# Patient Record
Sex: FEMALE | Race: WHITE | NOT HISPANIC OR LATINO | Employment: OTHER | URBAN - METROPOLITAN AREA
[De-identification: names, ages, dates, MRNs, and addresses within clinical notes are randomized per-mention and may not be internally consistent; named-entity substitution may affect disease eponyms.]

---

## 2017-05-25 DIAGNOSIS — I10 ESSENTIAL HYPERTENSION: Primary | ICD-10-CM

## 2017-05-25 DIAGNOSIS — E78.5 HYPERLIPIDEMIA, UNSPECIFIED HYPERLIPIDEMIA TYPE: ICD-10-CM

## 2017-06-07 LAB
ALBUMIN SERPL-MCNC: 4.3 G/DL (ref 3.5–5.2)
ALBUMIN/GLOB SERPL: 1.7 G/DL
ALP SERPL-CCNC: 68 U/L (ref 39–117)
ALT SERPL-CCNC: 16 U/L (ref 1–33)
AST SERPL-CCNC: 21 U/L (ref 1–32)
BILIRUB SERPL-MCNC: 0.3 MG/DL (ref 0.1–1.2)
BUN SERPL-MCNC: 17 MG/DL (ref 8–23)
BUN/CREAT SERPL: 19.1 (ref 7–25)
CALCIUM SERPL-MCNC: 10.1 MG/DL (ref 8.6–10.5)
CHLORIDE SERPL-SCNC: 104 MMOL/L (ref 98–107)
CHOLEST SERPL-MCNC: 205 MG/DL (ref 100–199)
CO2 SERPL-SCNC: 25.3 MMOL/L (ref 22–29)
CREAT SERPL-MCNC: 0.89 MG/DL (ref 0.57–1)
GLOBULIN SER CALC-MCNC: 2.5 GM/DL
GLUCOSE SERPL-MCNC: 97 MG/DL (ref 65–99)
HDL SERPL-SCNC: 36.7 UMOL/L
HDLC SERPL-MCNC: 64 MG/DL
LDL SERPL QN: 21.5 NM
LDL SERPL-SCNC: 1193 NMOL/L
LDL SMALL SERPL-SCNC: 301 NMOL/L
LDLC SERPL CALC-MCNC: 123 MG/DL (ref 0–99)
POTASSIUM SERPL-SCNC: 3.8 MMOL/L (ref 3.5–5.2)
PROT SERPL-MCNC: 6.8 G/DL (ref 6–8.5)
SODIUM SERPL-SCNC: 143 MMOL/L (ref 136–145)
TRIGL SERPL-MCNC: 90 MG/DL (ref 0–149)

## 2017-06-13 ENCOUNTER — OFFICE VISIT (OUTPATIENT)
Dept: FAMILY MEDICINE CLINIC | Facility: CLINIC | Age: 62
End: 2017-06-13

## 2017-06-13 VITALS
HEIGHT: 64 IN | BODY MASS INDEX: 21.75 KG/M2 | WEIGHT: 127.4 LBS | OXYGEN SATURATION: 98 % | HEART RATE: 111 BPM | DIASTOLIC BLOOD PRESSURE: 78 MMHG | SYSTOLIC BLOOD PRESSURE: 128 MMHG | TEMPERATURE: 98.7 F

## 2017-06-13 DIAGNOSIS — E78.5 HYPERLIPIDEMIA, UNSPECIFIED HYPERLIPIDEMIA TYPE: ICD-10-CM

## 2017-06-13 DIAGNOSIS — I10 ESSENTIAL HYPERTENSION: Primary | ICD-10-CM

## 2017-06-13 DIAGNOSIS — F41.9 ANXIETY: ICD-10-CM

## 2017-06-13 DIAGNOSIS — Z23 NEED FOR SHINGLES VACCINE: ICD-10-CM

## 2017-06-13 PROCEDURE — 90471 IMMUNIZATION ADMIN: CPT | Performed by: INTERNAL MEDICINE

## 2017-06-13 PROCEDURE — 99213 OFFICE O/P EST LOW 20 MIN: CPT | Performed by: INTERNAL MEDICINE

## 2017-06-13 PROCEDURE — 90736 HZV VACCINE LIVE SUBQ: CPT | Performed by: INTERNAL MEDICINE

## 2017-06-13 RX ORDER — IRBESARTAN 150 MG/1
150 TABLET ORAL DAILY
Qty: 90 TABLET | Refills: 3 | Status: SHIPPED | OUTPATIENT
Start: 2017-06-13 | End: 2018-06-13 | Stop reason: SDUPTHER

## 2017-06-13 RX ORDER — ALPRAZOLAM 0.25 MG/1
0.25 TABLET ORAL DAILY
Qty: 30 TABLET | Refills: 2 | Status: SHIPPED | OUTPATIENT
Start: 2017-06-13 | End: 2017-12-14 | Stop reason: SDUPTHER

## 2017-06-13 NOTE — PROGRESS NOTES
Subjective   Natasha Langford is a 61 y.o. female. Patient is here today for follow-up on her hypertension, hyperlipidemia and anxiety.  She generally is been doing fine and does not take the alprazolam daily.  She's not needed the omeprazole on a regular basis either.  She has had some anxiety but otherwise is been well.  Home blood pressure readings are always generally good.    Chief Complaint   Patient presents with   • Hypertension     follow up labs           Vitals:    06/13/17 1306   BP: 128/78   Pulse: 111   Temp: 98.7 °F (37.1 °C)   SpO2: 98%     The following portions of the patient's history were reviewed and updated as appropriate: allergies, current medications, past family history, past medical history, past social history, past surgical history and problem list.    Past Medical History:   Diagnosis Date   • Plantar fasciitis       Allergies   Allergen Reactions   • Latex    • Procaine       Social History     Social History   • Marital status:      Spouse name: N/A   • Number of children: N/A   • Years of education: N/A     Occupational History   • Not on file.     Social History Main Topics   • Smoking status: Never Smoker   • Smokeless tobacco: Never Used   • Alcohol use Yes   • Drug use: Defer   • Sexual activity: Defer     Other Topics Concern   • Not on file     Social History Narrative        Current Outpatient Prescriptions:   •  ALPRAZolam (XANAX) 0.25 MG tablet, Take 1 tablet by mouth Daily., Disp: 30 tablet, Rfl: 2  •  Cholecalciferol (VITAMIN D) 1000 UNITS tablet, Take  by mouth., Disp: , Rfl:   •  doxycycline (VIBRAMYICN) 100 MG tablet, Take 1 tablet by mouth 2 (Two) Times a Day., Disp: 60 tablet, Rfl: 5  •  irbesartan (AVAPRO) 150 MG tablet, Take 1 tablet by mouth Daily., Disp: 90 tablet, Rfl: 3  •  omeprazole (PriLOSEC) 20 MG capsule, Take 1 capsule by mouth daily., Disp: 30 capsule, Rfl: 6     Objective     History of Present Illness     Review of Systems   Constitutional: Negative.     HENT: Negative.    Eyes: Negative.    Respiratory: Negative.    Cardiovascular: Negative.    Gastrointestinal: Negative.    Genitourinary: Negative.    Musculoskeletal: Negative.    Skin: Negative.    Neurological: Negative.    Psychiatric/Behavioral: Negative.        Physical Exam   Constitutional: She appears well-developed and well-nourished.   Pleasant, cooperative and in no distress with a high blood pressure currently.  Patient has whitecoat hypertension   HENT:   Head: Normocephalic and atraumatic.   Eyes: Conjunctivae are normal.   Neck: Normal range of motion. Neck supple.   Cardiovascular: Normal rate, regular rhythm and normal heart sounds.    Pulmonary/Chest: Effort normal and breath sounds normal. No respiratory distress. She has no wheezes. She has no rales.   Musculoskeletal: Normal range of motion. She exhibits no edema.   Neurological: She is alert.   Skin: Skin is warm and dry.   Psychiatric: She has a normal mood and affect. Her behavior is normal.   Nursing note and vitals reviewed.      ASSESSMENT  overall the patient stable.  Her CMP was completely normal and her lipid panel is stable and acceptable with the LDL particle number in the moderate range.  The patient's GERD is fine and her anxiety is under reasonable control.  Navin was reviewed and is appropriate.     Problem List Items Addressed This Visit        Cardiovascular and Mediastinum    HLD (hyperlipidemia)    BP (high blood pressure) - Primary    Relevant Medications    irbesartan (AVAPRO) 150 MG tablet       Other    Anxiety          PLAN  The patient will get a  Zostavax immunization today.  I refilled her alprazolam.  She will continue her irbesartan and I'll recheck her in 6 months with a CMP and NMR lipid panel    There are no Patient Instructions on file for this visit.  Return in about 6 months (around 12/13/2017) for with labs.

## 2017-06-23 RX ORDER — DOXYCYCLINE HYCLATE 100 MG
TABLET ORAL
Qty: 60 TABLET | Refills: 4 | Status: SHIPPED | OUTPATIENT
Start: 2017-06-23 | End: 2017-12-14 | Stop reason: SDUPTHER

## 2017-12-01 DIAGNOSIS — E78.5 HYPERLIPIDEMIA, UNSPECIFIED HYPERLIPIDEMIA TYPE: ICD-10-CM

## 2017-12-01 DIAGNOSIS — Z11.59 NEED FOR HEPATITIS C SCREENING TEST: Primary | ICD-10-CM

## 2017-12-08 LAB
ALBUMIN SERPL-MCNC: 4.3 G/DL (ref 3.5–5.2)
ALBUMIN/GLOB SERPL: 1.9 G/DL
ALP SERPL-CCNC: 84 U/L (ref 39–117)
ALT SERPL-CCNC: 15 U/L (ref 1–33)
AST SERPL-CCNC: 18 U/L (ref 1–32)
BILIRUB SERPL-MCNC: 0.5 MG/DL (ref 0.1–1.2)
BUN SERPL-MCNC: 20 MG/DL (ref 8–23)
BUN/CREAT SERPL: 22 (ref 7–25)
CALCIUM SERPL-MCNC: 9.9 MG/DL (ref 8.6–10.5)
CHLORIDE SERPL-SCNC: 103 MMOL/L (ref 98–107)
CHOLEST SERPL-MCNC: 199 MG/DL (ref 0–200)
CO2 SERPL-SCNC: 26.7 MMOL/L (ref 22–29)
CREAT SERPL-MCNC: 0.91 MG/DL (ref 0.57–1)
GFR SERPLBLD CREATININE-BSD FMLA CKD-EPI: 62 ML/MIN/1.73
GFR SERPLBLD CREATININE-BSD FMLA CKD-EPI: 76 ML/MIN/1.73
GLOBULIN SER CALC-MCNC: 2.3 GM/DL
GLUCOSE SERPL-MCNC: 92 MG/DL (ref 65–99)
HCV AB S/CO SERPL IA: <0.1 S/CO RATIO (ref 0–0.9)
HCV AB SERPL QL IA: NORMAL
HDLC SERPL-MCNC: 71 MG/DL (ref 40–60)
LDLC SERPL CALC-MCNC: 110 MG/DL (ref 0–100)
LDLC/HDLC SERPL: 1.55 {RATIO}
POTASSIUM SERPL-SCNC: 4.1 MMOL/L (ref 3.5–5.2)
PROT SERPL-MCNC: 6.6 G/DL (ref 6–8.5)
SODIUM SERPL-SCNC: 142 MMOL/L (ref 136–145)
TRIGL SERPL-MCNC: 89 MG/DL (ref 0–150)
VLDLC SERPL CALC-MCNC: 17.8 MG/DL (ref 5–40)

## 2017-12-14 ENCOUNTER — OFFICE VISIT (OUTPATIENT)
Dept: FAMILY MEDICINE CLINIC | Facility: CLINIC | Age: 62
End: 2017-12-14

## 2017-12-14 VITALS
DIASTOLIC BLOOD PRESSURE: 80 MMHG | HEART RATE: 112 BPM | OXYGEN SATURATION: 99 % | HEIGHT: 64 IN | SYSTOLIC BLOOD PRESSURE: 132 MMHG | BODY MASS INDEX: 21.41 KG/M2 | WEIGHT: 125.4 LBS | TEMPERATURE: 98 F

## 2017-12-14 DIAGNOSIS — E78.5 HYPERLIPIDEMIA, UNSPECIFIED HYPERLIPIDEMIA TYPE: ICD-10-CM

## 2017-12-14 DIAGNOSIS — I10 ESSENTIAL HYPERTENSION: Primary | ICD-10-CM

## 2017-12-14 DIAGNOSIS — F41.9 ANXIETY: ICD-10-CM

## 2017-12-14 PROCEDURE — 99214 OFFICE O/P EST MOD 30 MIN: CPT | Performed by: INTERNAL MEDICINE

## 2017-12-14 RX ORDER — DOXYCYCLINE HYCLATE 100 MG
100 TABLET ORAL 2 TIMES DAILY
Qty: 60 TABLET | Refills: 4 | Status: SHIPPED | OUTPATIENT
Start: 2017-12-14 | End: 2018-06-13 | Stop reason: SDUPTHER

## 2017-12-14 RX ORDER — SIMVASTATIN 5 MG
5 TABLET ORAL NIGHTLY
Qty: 30 TABLET | Refills: 5 | Status: SHIPPED | OUTPATIENT
Start: 2017-12-14 | End: 2018-08-21 | Stop reason: SDUPTHER

## 2017-12-14 RX ORDER — ALPRAZOLAM 0.25 MG/1
0.25 TABLET ORAL DAILY
Qty: 30 TABLET | Refills: 2 | Status: SHIPPED | OUTPATIENT
Start: 2017-12-14 | End: 2018-06-13 | Stop reason: SDUPTHER

## 2017-12-14 NOTE — PROGRESS NOTES
Subjective   Natasha Langford is a 61 y.o. female. Patient is here today for follow-up on her hypertension, hyperlipidemia and anxiety.  She is generally been doing well.  She uses the alprazolam about once every other day and is fine.  Home blood pressure readings and then okay and she's had no medicine side effects.  She has seen the gynecologist in the summer and had mammograms and Pap smear.  She has not had a colonoscopy.  She got a flu shot earlier.    Chief Complaint   Patient presents with   • Hyperlipidemia     HTN, ANXIETY- FOLLOW UP LABS AND NEEDS REFILL ON ALPRAZOLAM           Vitals:    12/14/17 0756   BP: 132/80   Pulse: 112   Temp: 98 °F (36.7 °C)   SpO2: 99%     The following portions of the patient's history were reviewed and updated as appropriate: allergies, current medications, past family history, past medical history, past social history, past surgical history and problem list.    Past Medical History:   Diagnosis Date   • Plantar fasciitis       Allergies   Allergen Reactions   • Latex    • Procaine       Social History     Social History   • Marital status:      Spouse name: N/A   • Number of children: N/A   • Years of education: N/A     Occupational History   • Not on file.     Social History Main Topics   • Smoking status: Never Smoker   • Smokeless tobacco: Never Used   • Alcohol use Yes   • Drug use: Defer   • Sexual activity: Defer     Other Topics Concern   • Not on file     Social History Narrative        Current Outpatient Prescriptions:   •  ALPRAZolam (XANAX) 0.25 MG tablet, Take 1 tablet by mouth Daily., Disp: 30 tablet, Rfl: 2  •  Cholecalciferol (VITAMIN D) 1000 UNITS tablet, Take  by mouth., Disp: , Rfl:   •  doxycycline (VIBRAMYICN) 100 MG tablet, Take 1 tablet by mouth 2 (Two) Times a Day., Disp: 60 tablet, Rfl: 4  •  irbesartan (AVAPRO) 150 MG tablet, Take 1 tablet by mouth Daily., Disp: 90 tablet, Rfl: 3  •  omeprazole (PriLOSEC) 20 MG capsule, Take 1 capsule by mouth  daily., Disp: 30 capsule, Rfl: 6  •  simvastatin (ZOCOR) 5 MG tablet, Take 1 tablet by mouth Every Night., Disp: 30 tablet, Rfl: 5     Objective     History of Present Illness     Review of Systems   Constitutional: Negative.    HENT: Negative.    Eyes: Negative.    Respiratory: Negative.    Cardiovascular: Negative.    Gastrointestinal: Negative.    Endocrine: Negative.    Genitourinary: Negative.    Musculoskeletal: Negative.    Neurological: Negative.    Psychiatric/Behavioral: Negative.        Physical Exam   Constitutional: She is oriented to person, place, and time. She appears well-developed and well-nourished.   Pleasant, cooperative no distress with a blood pressure 130/80   HENT:   Head: Normocephalic and atraumatic.   Eyes: Conjunctivae are normal. Pupils are equal, round, and reactive to light. No scleral icterus.   Neck: Normal range of motion. Neck supple.   Cardiovascular: Normal rate, regular rhythm and normal heart sounds.    Pulmonary/Chest: Effort normal and breath sounds normal. No respiratory distress. She has no wheezes. She has no rales.   Musculoskeletal: Normal range of motion. She exhibits no edema.   Neurological: She is alert and oriented to person, place, and time.   Skin: Skin is warm and dry.   Psychiatric: She has a normal mood and affect. Her behavior is normal.   Nursing note and vitals reviewed.      ASSESSMENT  hepatitis C screen was negative.  CMP was completely normal and lipid panel is stable with a total cholesterol 199, HDL 71,  and triglycerides of 89.  #1-hypertension, well controlled  #2-hyperlipidemia, stable  #3-anxiety, adequately controlled on occasional alprazolam  The patient's Navin was reviewed and is appropriate.     Problem List Items Addressed This Visit        Cardiovascular and Mediastinum    HLD (hyperlipidemia)    Relevant Medications    simvastatin (ZOCOR) 5 MG tablet    BP (high blood pressure) - Primary       Other    Anxiety          PLAN  I  refilled the patient's alprazolam.  I'm going to start her on simvastatin 5 mg daily for her lipids and would like to recheck her in 6 months for complete physical exam including an EKG, CBC, CMP, NMR lipid panel, TSH, vitamin D level, urinalysis but no chest x-ray less she's having pulmonary symptoms    There are no Patient Instructions on file for this visit.  Return in about 6 months (around 6/14/2018) for with labs, Annual physical.

## 2018-05-31 DIAGNOSIS — E78.5 HYPERLIPIDEMIA, UNSPECIFIED HYPERLIPIDEMIA TYPE: ICD-10-CM

## 2018-05-31 DIAGNOSIS — I10 ESSENTIAL HYPERTENSION: ICD-10-CM

## 2018-05-31 DIAGNOSIS — E55.9 AVITAMINOSIS D: ICD-10-CM

## 2018-06-08 LAB
25(OH)D3+25(OH)D2 SERPL-MCNC: 31.3 NG/ML (ref 30–100)
ALBUMIN SERPL-MCNC: 4.1 G/DL (ref 3.5–5.2)
ALBUMIN/GLOB SERPL: 1.9 G/DL
ALP SERPL-CCNC: 71 U/L (ref 39–117)
ALT SERPL-CCNC: 14 U/L (ref 1–33)
AST SERPL-CCNC: 19 U/L (ref 1–32)
BASOPHILS # BLD AUTO: 0.06 10*3/MM3 (ref 0–0.2)
BASOPHILS NFR BLD AUTO: 1.1 % (ref 0–1.5)
BILIRUB SERPL-MCNC: 0.5 MG/DL (ref 0.1–1.2)
BUN SERPL-MCNC: 17 MG/DL (ref 8–23)
BUN/CREAT SERPL: 16.3 (ref 7–25)
CALCIUM SERPL-MCNC: 10 MG/DL (ref 8.6–10.5)
CHLORIDE SERPL-SCNC: 102 MMOL/L (ref 98–107)
CHOLEST SERPL-MCNC: 210 MG/DL (ref 0–200)
CO2 SERPL-SCNC: 27.4 MMOL/L (ref 22–29)
CREAT SERPL-MCNC: 1.04 MG/DL (ref 0.57–1)
EOSINOPHIL # BLD AUTO: 0.1 10*3/MM3 (ref 0–0.7)
EOSINOPHIL NFR BLD AUTO: 1.8 % (ref 0.3–6.2)
ERYTHROCYTE [DISTWIDTH] IN BLOOD BY AUTOMATED COUNT: 13.3 % (ref 11.7–13)
GFR SERPLBLD CREATININE-BSD FMLA CKD-EPI: 53 ML/MIN/1.73
GFR SERPLBLD CREATININE-BSD FMLA CKD-EPI: 65 ML/MIN/1.73
GLOBULIN SER CALC-MCNC: 2.2 GM/DL
GLUCOSE SERPL-MCNC: 92 MG/DL (ref 65–99)
HCT VFR BLD AUTO: 42.6 % (ref 35.6–45.5)
HDLC SERPL-MCNC: 66 MG/DL (ref 40–60)
HGB BLD-MCNC: 13.4 G/DL (ref 11.9–15.5)
IMM GRANULOCYTES # BLD: 0 10*3/MM3 (ref 0–0.03)
IMM GRANULOCYTES NFR BLD: 0 % (ref 0–0.5)
LDLC SERPL CALC-MCNC: 123 MG/DL (ref 0–100)
LDLC/HDLC SERPL: 1.86 {RATIO}
LYMPHOCYTES # BLD AUTO: 2.04 10*3/MM3 (ref 0.9–4.8)
LYMPHOCYTES NFR BLD AUTO: 36.1 % (ref 19.6–45.3)
MCH RBC QN AUTO: 29.2 PG (ref 26.9–32)
MCHC RBC AUTO-ENTMCNC: 31.5 G/DL (ref 32.4–36.3)
MCV RBC AUTO: 92.8 FL (ref 80.5–98.2)
MONOCYTES # BLD AUTO: 0.55 10*3/MM3 (ref 0.2–1.2)
MONOCYTES NFR BLD AUTO: 9.7 % (ref 5–12)
NEUTROPHILS # BLD AUTO: 2.9 10*3/MM3 (ref 1.9–8.1)
NEUTROPHILS NFR BLD AUTO: 51.3 % (ref 42.7–76)
PLATELET # BLD AUTO: 278 10*3/MM3 (ref 140–500)
POTASSIUM SERPL-SCNC: 4.3 MMOL/L (ref 3.5–5.2)
PROT SERPL-MCNC: 6.3 G/DL (ref 6–8.5)
RBC # BLD AUTO: 4.59 10*6/MM3 (ref 3.9–5.2)
SODIUM SERPL-SCNC: 142 MMOL/L (ref 136–145)
TRIGL SERPL-MCNC: 106 MG/DL (ref 0–150)
TSH SERPL DL<=0.005 MIU/L-ACNC: 2.69 MIU/ML (ref 0.27–4.2)
VLDLC SERPL CALC-MCNC: 21.2 MG/DL (ref 5–40)
WBC # BLD AUTO: 5.65 10*3/MM3 (ref 4.5–10.7)

## 2018-06-13 ENCOUNTER — OFFICE VISIT (OUTPATIENT)
Dept: FAMILY MEDICINE CLINIC | Facility: CLINIC | Age: 63
End: 2018-06-13

## 2018-06-13 VITALS
BODY MASS INDEX: 21.78 KG/M2 | HEART RATE: 91 BPM | WEIGHT: 127.6 LBS | DIASTOLIC BLOOD PRESSURE: 80 MMHG | SYSTOLIC BLOOD PRESSURE: 120 MMHG | HEIGHT: 64 IN | OXYGEN SATURATION: 98 % | TEMPERATURE: 98.8 F

## 2018-06-13 DIAGNOSIS — I10 ESSENTIAL HYPERTENSION: Primary | ICD-10-CM

## 2018-06-13 DIAGNOSIS — Z12.11 ENCOUNTER FOR SCREENING COLONOSCOPY: ICD-10-CM

## 2018-06-13 DIAGNOSIS — E78.5 HYPERLIPIDEMIA, UNSPECIFIED HYPERLIPIDEMIA TYPE: ICD-10-CM

## 2018-06-13 DIAGNOSIS — E55.9 AVITAMINOSIS D: ICD-10-CM

## 2018-06-13 DIAGNOSIS — K21.9 GASTROESOPHAGEAL REFLUX DISEASE, ESOPHAGITIS PRESENCE NOT SPECIFIED: ICD-10-CM

## 2018-06-13 DIAGNOSIS — F41.9 ANXIETY: ICD-10-CM

## 2018-06-13 PROCEDURE — 99214 OFFICE O/P EST MOD 30 MIN: CPT | Performed by: INTERNAL MEDICINE

## 2018-06-13 RX ORDER — ALPRAZOLAM 0.25 MG/1
0.25 TABLET ORAL DAILY
Qty: 30 TABLET | Refills: 2 | Status: SHIPPED | OUTPATIENT
Start: 2018-06-13 | End: 2019-01-15 | Stop reason: SDUPTHER

## 2018-06-13 RX ORDER — IRBESARTAN 150 MG/1
150 TABLET ORAL DAILY
Qty: 90 TABLET | Refills: 3 | Status: SHIPPED | OUTPATIENT
Start: 2018-06-13 | End: 2019-06-04 | Stop reason: SDUPTHER

## 2018-06-13 RX ORDER — DOXYCYCLINE HYCLATE 100 MG
100 TABLET ORAL 2 TIMES DAILY
Qty: 60 TABLET | Refills: 4 | Status: SHIPPED | OUTPATIENT
Start: 2018-06-13 | End: 2019-01-15 | Stop reason: SDUPTHER

## 2018-06-13 NOTE — PROGRESS NOTES
Subjective   Natasha Langford is a 62 y.o. female. Patient is here today for follow-up on her hyperlipidemia, hypertension, vitamin D deficiency, GERD, and anxiety.  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  She is had no chest pain, shortness of breath, edema or myalgias and is tolerating medications.  She just has been taking the simvastatin regularly for about the past month.  Chief Complaint   Patient presents with   • Hyperlipidemia     HTN, AVITAMINOSIS D, ACID REFLUX- FOLLOW UP LABS AND MEDS   • Anxiety     NEEDS REFILL ON ALPRAZOLAM           Vitals:    06/13/18 0808   BP: 120/80   Pulse: 91   Temp: 98.8 °F (37.1 °C)   SpO2: 98%     The following portions of the patient's history were reviewed and updated as appropriate: allergies, current medications, past family history, past medical history, past social history, past surgical history and problem list.    Past Medical History:   Diagnosis Date   • Plantar fasciitis       Allergies   Allergen Reactions   • Latex    • Procaine       Social History     Social History   • Marital status:      Spouse name: N/A   • Number of children: N/A   • Years of education: N/A     Occupational History   • Not on file.     Social History Main Topics   • Smoking status: Never Smoker   • Smokeless tobacco: Never Used   • Alcohol use Yes   • Drug use: Unknown   • Sexual activity: Defer     Other Topics Concern   • Not on file     Social History Narrative   • No narrative on file        Current Outpatient Prescriptions:   •  ALPRAZolam (XANAX) 0.25 MG tablet, Take 1 tablet by mouth Daily., Disp: 30 tablet, Rfl: 2  •  Cholecalciferol (VITAMIN D) 1000 UNITS tablet, Take  by mouth., Disp: , Rfl:   •  doxycycline (VIBRAMYICN) 100 MG tablet, Take 1 tablet by mouth 2 (Two) Times a Day., Disp: 60 tablet, Rfl: 4  •  irbesartan (AVAPRO) 150 MG tablet, Take 1 tablet by mouth Daily., Disp: 90 tablet, Rfl: 3  •  simvastatin (ZOCOR) 5 MG tablet, Take 1 tablet by mouth  Every Night., Disp: 30 tablet, Rfl: 5  •  omeprazole (PriLOSEC) 20 MG capsule, Take 1 capsule by mouth daily., Disp: 30 capsule, Rfl: 6     Objective     History of Present Illness     Review of Systems   Constitutional: Negative.    HENT: Negative.    Eyes: Negative.    Respiratory: Negative.    Cardiovascular: Negative.    Gastrointestinal: Negative.    Endocrine: Negative.    Genitourinary: Negative.    Musculoskeletal: Negative.    Skin: Negative.    Neurological: Negative.    Psychiatric/Behavioral: Negative.        Physical Exam   Constitutional: She is oriented to person, place, and time. She appears well-developed and well-nourished.   Pleasant, cooperative no distress blood pressure 135/80   HENT:   Head: Normocephalic and atraumatic.   Eyes: Conjunctivae are normal. Pupils are equal, round, and reactive to light. No scleral icterus.   Neck: Normal range of motion. Neck supple. No thyromegaly present.   Cardiovascular: Normal rate, regular rhythm and normal heart sounds.    Pulmonary/Chest: Effort normal and breath sounds normal. No respiratory distress. She has no wheezes. She has no rales.   Musculoskeletal: Normal range of motion. She exhibits no edema.   Neurological: She is alert and oriented to person, place, and time.   Skin: Skin is warm and dry.   Psychiatric: She has a normal mood and affect. Her behavior is normal.   Nursing note and vitals reviewed.      ASSESSMENT  CBC was normal.  CMP was fine aside from a creatinine of 1.04.  TSH was quite normal and vitamin D level was normal.  Her lipid panel is still high with a total cholesterol 210, good HDL of 66 but an LDL high at 123  #1-hypertension, adequately controlled  #2-hyperlipidemia, not optimally controlled but only back on the medicine for a month.  #3-vitamin D deficiency, normal levels today  #4-GERD, controlled on medication  #5-anxiety, mild and less of a problem during the summer and controlled by medication  Her Navin was reviewed  and is appropriate.     Problem List Items Addressed This Visit        Cardiovascular and Mediastinum    HLD (hyperlipidemia)    BP (high blood pressure) - Primary    Relevant Medications    irbesartan (AVAPRO) 150 MG tablet       Digestive    Avitaminosis D       Other    Anxiety      Other Visit Diagnoses     Encounter for screening colonoscopy        Relevant Orders    Ambulatory Referral For Screening Colonoscopy          PLAN  I referred the patient to the general surgeons for routine colonoscopy.  I recommended shingles and hepatitis A immunizations.  I refilled the patient's medications.  I plan on rechecking her in 2 months on her cholesterol with that CMP and lipid panel    There are no Patient Instructions on file for this visit.  Return in about 2 months (around 8/13/2018) for with labs.

## 2018-08-06 DIAGNOSIS — E78.5 HYPERLIPIDEMIA, UNSPECIFIED HYPERLIPIDEMIA TYPE: Primary | ICD-10-CM

## 2018-08-13 LAB
ALBUMIN SERPL-MCNC: 4.2 G/DL (ref 3.5–5.2)
ALBUMIN/GLOB SERPL: 1.8 G/DL
ALP SERPL-CCNC: 82 U/L (ref 39–117)
ALT SERPL-CCNC: 16 U/L (ref 1–33)
AST SERPL-CCNC: 22 U/L (ref 1–32)
BILIRUB SERPL-MCNC: 0.7 MG/DL (ref 0.1–1.2)
BUN SERPL-MCNC: 18 MG/DL (ref 8–23)
BUN/CREAT SERPL: 16.2 (ref 7–25)
CALCIUM SERPL-MCNC: 9.9 MG/DL (ref 8.6–10.5)
CHLORIDE SERPL-SCNC: 103 MMOL/L (ref 98–107)
CHOLEST SERPL-MCNC: 171 MG/DL (ref 0–200)
CO2 SERPL-SCNC: 24.3 MMOL/L (ref 22–29)
CREAT SERPL-MCNC: 1.11 MG/DL (ref 0.57–1)
GLOBULIN SER CALC-MCNC: 2.4 GM/DL
GLUCOSE SERPL-MCNC: 88 MG/DL (ref 65–99)
HDLC SERPL-MCNC: 70 MG/DL (ref 40–60)
LDLC SERPL CALC-MCNC: 87 MG/DL (ref 0–100)
LDLC/HDLC SERPL: 1.24 {RATIO}
POTASSIUM SERPL-SCNC: 4.1 MMOL/L (ref 3.5–5.2)
PROT SERPL-MCNC: 6.6 G/DL (ref 6–8.5)
SODIUM SERPL-SCNC: 142 MMOL/L (ref 136–145)
TRIGL SERPL-MCNC: 72 MG/DL (ref 0–150)
VLDLC SERPL CALC-MCNC: 14.4 MG/DL (ref 5–40)

## 2018-08-21 ENCOUNTER — OFFICE VISIT (OUTPATIENT)
Dept: FAMILY MEDICINE CLINIC | Facility: CLINIC | Age: 63
End: 2018-08-21

## 2018-08-21 VITALS
OXYGEN SATURATION: 98 % | HEART RATE: 86 BPM | HEIGHT: 64 IN | BODY MASS INDEX: 20.76 KG/M2 | SYSTOLIC BLOOD PRESSURE: 110 MMHG | DIASTOLIC BLOOD PRESSURE: 66 MMHG | TEMPERATURE: 98.1 F | WEIGHT: 121.6 LBS

## 2018-08-21 DIAGNOSIS — I10 ESSENTIAL HYPERTENSION: Primary | ICD-10-CM

## 2018-08-21 DIAGNOSIS — E78.5 HYPERLIPIDEMIA, UNSPECIFIED HYPERLIPIDEMIA TYPE: ICD-10-CM

## 2018-08-21 DIAGNOSIS — K21.9 GASTROESOPHAGEAL REFLUX DISEASE, ESOPHAGITIS PRESENCE NOT SPECIFIED: ICD-10-CM

## 2018-08-21 DIAGNOSIS — F41.9 ANXIETY: ICD-10-CM

## 2018-08-21 PROCEDURE — 99214 OFFICE O/P EST MOD 30 MIN: CPT | Performed by: INTERNAL MEDICINE

## 2018-08-21 RX ORDER — SIMVASTATIN 5 MG
5 TABLET ORAL NIGHTLY
Qty: 90 TABLET | Refills: 3 | Status: SHIPPED | OUTPATIENT
Start: 2018-08-21 | End: 2019-08-02 | Stop reason: SDUPTHER

## 2018-08-21 NOTE — PROGRESS NOTES
Subjective   Natasha Langford is a 62 y.o. female. Patient is here today for follow-up on her hyperlipidemia, hypertension and GERD.  She is doing fine and is had no chest pain, shortness of breath, edema or myalgias.  She also has some chronic anxiety that's been controlled on alprazolam and she does not take that regularly.  She is noted no side effects from the simvastatin.  Chief Complaint   Patient presents with   • Hyperlipidemia     HIGH BP- FOLLOW UP LABS          Vitals:    08/21/18 0823   BP: 110/66   Pulse: 86   Temp: 98.1 °F (36.7 °C)   SpO2: 98%     The following portions of the patient's history were reviewed and updated as appropriate: allergies, current medications, past family history, past medical history, past social history, past surgical history and problem list.    Past Medical History:   Diagnosis Date   • Plantar fasciitis       Allergies   Allergen Reactions   • Latex    • Procaine       Social History     Social History   • Marital status:      Spouse name: N/A   • Number of children: N/A   • Years of education: N/A     Occupational History   • Not on file.     Social History Main Topics   • Smoking status: Never Smoker   • Smokeless tobacco: Never Used   • Alcohol use Yes   • Drug use: Unknown   • Sexual activity: Defer     Other Topics Concern   • Not on file     Social History Narrative   • No narrative on file        Current Outpatient Prescriptions:   •  ALPRAZolam (XANAX) 0.25 MG tablet, Take 1 tablet by mouth Daily., Disp: 30 tablet, Rfl: 2  •  Cholecalciferol (VITAMIN D) 1000 UNITS tablet, Take  by mouth., Disp: , Rfl:   •  doxycycline (VIBRAMYICN) 100 MG tablet, Take 1 tablet by mouth 2 (Two) Times a Day., Disp: 60 tablet, Rfl: 4  •  irbesartan (AVAPRO) 150 MG tablet, Take 1 tablet by mouth Daily., Disp: 90 tablet, Rfl: 3  •  omeprazole (PriLOSEC) 20 MG capsule, Take 1 capsule by mouth daily., Disp: 30 capsule, Rfl: 6  •  simvastatin (ZOCOR) 5 MG tablet, Take 1 tablet by mouth  Every Night., Disp: 90 tablet, Rfl: 3     Objective     History of Present Illness     Review of Systems   Constitutional: Negative.    HENT: Negative.    Eyes: Negative.    Respiratory: Negative.    Cardiovascular: Negative.    Gastrointestinal: Negative.    Endocrine: Negative.    Genitourinary: Negative.    Musculoskeletal: Negative.    Skin: Negative.    Neurological: Negative.    Psychiatric/Behavioral: Negative.        Physical Exam   Constitutional: She is oriented to person, place, and time. She appears well-developed and well-nourished.   Pleasant, cooperative no distress blood pressure 130/80   HENT:   Head: Normocephalic and atraumatic.   Eyes: Pupils are equal, round, and reactive to light. Conjunctivae are normal. No scleral icterus.   Neck: Normal range of motion. Neck supple.   Cardiovascular: Normal rate, regular rhythm and normal heart sounds.    Pulmonary/Chest: Effort normal and breath sounds normal. No respiratory distress. She has no wheezes. She has no rales.   Musculoskeletal: Normal range of motion. She exhibits no edema.   Neurological: She is alert and oriented to person, place, and time.   Skin: Skin is warm and dry.   Psychiatric: She has a normal mood and affect. Her behavior is normal.   Nursing note and vitals reviewed.      ASSESSMENT  CMP is generally normal aside from a creatinine of 1.11.  Lipid panel is improved with a total cholesterol 171, good HDL of 70 and LDL of 87  #1-hypertension controlled  #2-hyperlipidemia, well controlled on simvastatin  #3-history of GERD, controlled on medication  #4-anxiety, controlled with occasional alprazolam     Problem List Items Addressed This Visit        Cardiovascular and Mediastinum    HLD (hyperlipidemia)    Relevant Medications    simvastatin (ZOCOR) 5 MG tablet    BP (high blood pressure) - Primary          PLAN  I recommended the hepatitis A and shingles vaccinations.  Patient will continue current medicines as now.  I like to recheck  her in about 4 months with a CMP, lipid panel    There are no Patient Instructions on file for this visit.  Return in about 4 months (around 12/21/2018) for with labs.

## 2019-01-07 DIAGNOSIS — Z79.899 LONG TERM USE OF DRUG: ICD-10-CM

## 2019-01-07 DIAGNOSIS — E78.5 HYPERLIPIDEMIA, UNSPECIFIED HYPERLIPIDEMIA TYPE: Primary | ICD-10-CM

## 2019-01-08 LAB
ALBUMIN SERPL-MCNC: 4.4 G/DL (ref 3.5–5.2)
ALBUMIN/GLOB SERPL: 1.6 G/DL
ALP SERPL-CCNC: 74 U/L (ref 39–117)
ALT SERPL-CCNC: 16 U/L (ref 1–33)
AST SERPL-CCNC: 17 U/L (ref 1–32)
BILIRUB SERPL-MCNC: 0.7 MG/DL (ref 0.1–1.2)
BUN SERPL-MCNC: 19 MG/DL (ref 8–23)
BUN/CREAT SERPL: 20 (ref 7–25)
CALCIUM SERPL-MCNC: 10.1 MG/DL (ref 8.6–10.5)
CHLORIDE SERPL-SCNC: 104 MMOL/L (ref 98–107)
CHOLEST SERPL-MCNC: 191 MG/DL (ref 0–200)
CO2 SERPL-SCNC: 27.5 MMOL/L (ref 22–29)
CREAT SERPL-MCNC: 0.95 MG/DL (ref 0.57–1)
GLOBULIN SER CALC-MCNC: 2.8 GM/DL
GLUCOSE SERPL-MCNC: 96 MG/DL (ref 65–99)
HDLC SERPL-MCNC: 68 MG/DL (ref 40–60)
LDLC SERPL CALC-MCNC: 104 MG/DL (ref 0–100)
LDLC/HDLC SERPL: 1.52 {RATIO}
POTASSIUM SERPL-SCNC: 4.5 MMOL/L (ref 3.5–5.2)
PROT SERPL-MCNC: 7.2 G/DL (ref 6–8.5)
SODIUM SERPL-SCNC: 142 MMOL/L (ref 136–145)
TRIGL SERPL-MCNC: 97 MG/DL (ref 0–150)
VLDLC SERPL CALC-MCNC: 19.4 MG/DL (ref 5–40)

## 2019-01-15 ENCOUNTER — OFFICE VISIT (OUTPATIENT)
Dept: FAMILY MEDICINE CLINIC | Facility: CLINIC | Age: 64
End: 2019-01-15

## 2019-01-15 VITALS
RESPIRATION RATE: 15 BRPM | SYSTOLIC BLOOD PRESSURE: 110 MMHG | OXYGEN SATURATION: 98 % | TEMPERATURE: 98 F | HEART RATE: 120 BPM | WEIGHT: 126 LBS | HEIGHT: 64 IN | DIASTOLIC BLOOD PRESSURE: 70 MMHG | BODY MASS INDEX: 21.51 KG/M2

## 2019-01-15 DIAGNOSIS — K21.9 GASTROESOPHAGEAL REFLUX DISEASE, ESOPHAGITIS PRESENCE NOT SPECIFIED: ICD-10-CM

## 2019-01-15 DIAGNOSIS — Z23 NEED FOR VACCINATION: ICD-10-CM

## 2019-01-15 DIAGNOSIS — L70.0 ACNE VULGARIS: ICD-10-CM

## 2019-01-15 DIAGNOSIS — I10 ESSENTIAL HYPERTENSION: Primary | ICD-10-CM

## 2019-01-15 DIAGNOSIS — E78.5 HYPERLIPIDEMIA, UNSPECIFIED HYPERLIPIDEMIA TYPE: ICD-10-CM

## 2019-01-15 DIAGNOSIS — E55.9 AVITAMINOSIS D: ICD-10-CM

## 2019-01-15 DIAGNOSIS — F41.9 ANXIETY: ICD-10-CM

## 2019-01-15 PROCEDURE — 90471 IMMUNIZATION ADMIN: CPT | Performed by: INTERNAL MEDICINE

## 2019-01-15 PROCEDURE — 90670 PCV13 VACCINE IM: CPT | Performed by: INTERNAL MEDICINE

## 2019-01-15 PROCEDURE — 99214 OFFICE O/P EST MOD 30 MIN: CPT | Performed by: INTERNAL MEDICINE

## 2019-01-15 RX ORDER — DOXYCYCLINE HYCLATE 100 MG
100 TABLET ORAL 2 TIMES DAILY
Qty: 60 TABLET | Refills: 4 | Status: SHIPPED | OUTPATIENT
Start: 2019-01-15 | End: 2019-08-28 | Stop reason: SDUPTHER

## 2019-01-15 RX ORDER — ALPRAZOLAM 0.25 MG/1
0.25 TABLET ORAL DAILY
Qty: 30 TABLET | Refills: 2 | Status: SHIPPED | OUTPATIENT
Start: 2019-01-15 | End: 2019-08-02 | Stop reason: SDUPTHER

## 2019-01-15 NOTE — PROGRESS NOTES
Subjective   Natasha Langford is a 63 y.o. female. Patient is here today for follow-up on her hypertension, hyperlipidemia, GERD, vitamin D deficiency, anxiety and history of facial acne.  She generally been stable on medications.  She continues on the doxycycline twice a day as needed for outbreaks.  She is using over-the-counter omeprazole for her GERD but does not needed every day.  Otherwise she's felt well and is had no chest pain, shortness of breath, edema or myalgias.  Chief Complaint   Patient presents with   • Hypertension   • Hyperlipidemia          Vitals:    01/15/19 0838   BP: 110/70   Pulse: 120   Resp: 15   Temp: 98 °F (36.7 °C)   SpO2: 98%     The following portions of the patient's history were reviewed and updated as appropriate: allergies, current medications, past family history, past medical history, past social history, past surgical history and problem list.    Past Medical History:   Diagnosis Date   • Plantar fasciitis       Allergies   Allergen Reactions   • Latex    • Procaine       Social History     Socioeconomic History   • Marital status:      Spouse name: Not on file   • Number of children: Not on file   • Years of education: Not on file   • Highest education level: Not on file   Social Needs   • Financial resource strain: Not on file   • Food insecurity - worry: Not on file   • Food insecurity - inability: Not on file   • Transportation needs - medical: Not on file   • Transportation needs - non-medical: Not on file   Occupational History   • Not on file   Tobacco Use   • Smoking status: Never Smoker   • Smokeless tobacco: Never Used   Substance and Sexual Activity   • Alcohol use: Yes   • Drug use: Defer   • Sexual activity: Defer   Other Topics Concern   • Not on file   Social History Narrative   • Not on file        Current Outpatient Medications:   •  ALPRAZolam (XANAX) 0.25 MG tablet, Take 1 tablet by mouth Daily., Disp: 30 tablet, Rfl: 2  •  Cholecalciferol (VITAMIN D) 1000  UNITS tablet, Take  by mouth., Disp: , Rfl:   •  doxycycline (VIBRAMYICN) 100 MG tablet, Take 1 tablet by mouth 2 (Two) Times a Day., Disp: 60 tablet, Rfl: 4  •  irbesartan (AVAPRO) 150 MG tablet, Take 1 tablet by mouth Daily., Disp: 90 tablet, Rfl: 3  •  omeprazole (PriLOSEC) 20 MG capsule, Take 1 capsule by mouth daily., Disp: 30 capsule, Rfl: 6  •  simvastatin (ZOCOR) 5 MG tablet, Take 1 tablet by mouth Every Night., Disp: 90 tablet, Rfl: 3     Objective     History of Present Illness     Review of Systems   Constitutional: Negative.    HENT: Negative.    Eyes: Negative.    Respiratory: Negative.    Cardiovascular: Negative.    Gastrointestinal: Negative.    Genitourinary: Negative.    Musculoskeletal: Negative.    Neurological: Negative.    Psychiatric/Behavioral: Negative.        Physical Exam   Constitutional: She is oriented to person, place, and time. She appears well-developed and well-nourished.   Pleasant, cooperative no distress blood pressure 120/80   HENT:   Head: Normocephalic and atraumatic.   Eyes: Conjunctivae are normal. Pupils are equal, round, and reactive to light. No scleral icterus.   Neck: Normal range of motion. Neck supple.   Cardiovascular: Normal rate, regular rhythm and normal heart sounds.   Pulmonary/Chest: Effort normal and breath sounds normal. No respiratory distress. She has no wheezes. She has no rales.   Musculoskeletal: Normal range of motion. She exhibits no edema.   Neurological: She is alert and oriented to person, place, and time.   Skin: Skin is warm and dry.   Psychiatric: She has a normal mood and affect. Her behavior is normal.   Nursing note and vitals reviewed.      ASSESSMENT  lipid panels a bit higher than usual but the patient has missed some medication and her total cholesterol was 191, HDL 68 and .  CMP was normal.  #1-hypertension, well controlled on medication  #2-hyperlipidemia, reasonable control on medication  #3-GERD, controlled on over-the-counter  medicine  #4-anxiety, adequate control on medication  #5-facial acne, controlled on doxycycline as needed     Problem List Items Addressed This Visit        Cardiovascular and Mediastinum    HLD (hyperlipidemia)    BP (high blood pressure) - Primary       Digestive    Avitaminosis D       Other    Anxiety          PLAN  the patient received a Prevnar 13 vaccination today.  I plan on rechecking her in 6 months with a complete physical exam including an EKG, CBC, CMP, lipid panel, vitamin D level, TSH, urinalysis no chest x-ray and less pulmonary symptoms are present    There are no Patient Instructions on file for this visit.  Return in about 6 months (around 7/15/2019) for with labs, Annual physical.

## 2019-06-04 RX ORDER — IRBESARTAN 150 MG/1
TABLET ORAL
Qty: 90 TABLET | Refills: 2 | Status: SHIPPED | OUTPATIENT
Start: 2019-06-04 | End: 2020-02-11 | Stop reason: SDUPTHER

## 2019-06-27 DIAGNOSIS — Z00.00 ROUTINE HEALTH MAINTENANCE: Primary | ICD-10-CM

## 2019-06-27 DIAGNOSIS — E55.9 AVITAMINOSIS D: ICD-10-CM

## 2019-06-27 DIAGNOSIS — F41.9 ANXIETY: ICD-10-CM

## 2019-06-27 DIAGNOSIS — I10 ESSENTIAL HYPERTENSION: ICD-10-CM

## 2019-07-29 DIAGNOSIS — E78.5 HYPERLIPIDEMIA, UNSPECIFIED HYPERLIPIDEMIA TYPE: Primary | ICD-10-CM

## 2019-07-29 DIAGNOSIS — E55.9 AVITAMINOSIS D: ICD-10-CM

## 2019-07-29 DIAGNOSIS — I10 ESSENTIAL HYPERTENSION: ICD-10-CM

## 2019-07-29 LAB
25(OH)D3+25(OH)D2 SERPL-MCNC: 34.4 NG/ML (ref 30–100)
ALBUMIN SERPL-MCNC: 4.1 G/DL (ref 3.5–5.2)
ALBUMIN/GLOB SERPL: 1.7 G/DL
ALP SERPL-CCNC: 88 U/L (ref 39–117)
ALT SERPL-CCNC: 25 U/L (ref 1–33)
AST SERPL-CCNC: 25 U/L (ref 1–32)
BILIRUB SERPL-MCNC: 0.5 MG/DL (ref 0.2–1.2)
BUN SERPL-MCNC: 19 MG/DL (ref 8–23)
BUN/CREAT SERPL: 20.7 (ref 7–25)
CALCIUM SERPL-MCNC: 9.5 MG/DL (ref 8.6–10.5)
CHLORIDE SERPL-SCNC: 105 MMOL/L (ref 98–107)
CHOLEST SERPL-MCNC: 165 MG/DL (ref 0–200)
CO2 SERPL-SCNC: 26.7 MMOL/L (ref 22–29)
CREAT SERPL-MCNC: 0.92 MG/DL (ref 0.57–1)
GLOBULIN SER CALC-MCNC: 2.4 GM/DL
GLUCOSE SERPL-MCNC: 97 MG/DL (ref 65–99)
HDLC SERPL-MCNC: 61 MG/DL (ref 40–60)
LDLC SERPL CALC-MCNC: 87 MG/DL (ref 0–100)
LDLC/HDLC SERPL: 1.42 {RATIO}
POTASSIUM SERPL-SCNC: 4.1 MMOL/L (ref 3.5–5.2)
PROT SERPL-MCNC: 6.5 G/DL (ref 6–8.5)
SODIUM SERPL-SCNC: 142 MMOL/L (ref 136–145)
TRIGL SERPL-MCNC: 87 MG/DL (ref 0–150)
TSH SERPL DL<=0.005 MIU/L-ACNC: 2.53 MIU/ML (ref 0.27–4.2)
VLDLC SERPL CALC-MCNC: 17.4 MG/DL

## 2019-08-02 ENCOUNTER — OFFICE VISIT (OUTPATIENT)
Dept: FAMILY MEDICINE CLINIC | Facility: CLINIC | Age: 64
End: 2019-08-02

## 2019-08-02 VITALS
HEART RATE: 99 BPM | TEMPERATURE: 97.9 F | DIASTOLIC BLOOD PRESSURE: 74 MMHG | BODY MASS INDEX: 22.88 KG/M2 | OXYGEN SATURATION: 99 % | WEIGHT: 134 LBS | RESPIRATION RATE: 16 BRPM | HEIGHT: 64 IN | SYSTOLIC BLOOD PRESSURE: 124 MMHG

## 2019-08-02 DIAGNOSIS — K21.9 GASTROESOPHAGEAL REFLUX DISEASE, ESOPHAGITIS PRESENCE NOT SPECIFIED: ICD-10-CM

## 2019-08-02 DIAGNOSIS — E78.5 HYPERLIPIDEMIA, UNSPECIFIED HYPERLIPIDEMIA TYPE: ICD-10-CM

## 2019-08-02 DIAGNOSIS — E55.9 AVITAMINOSIS D: ICD-10-CM

## 2019-08-02 DIAGNOSIS — I10 ESSENTIAL HYPERTENSION: Primary | ICD-10-CM

## 2019-08-02 DIAGNOSIS — F41.9 ANXIETY: ICD-10-CM

## 2019-08-02 PROCEDURE — 99214 OFFICE O/P EST MOD 30 MIN: CPT | Performed by: INTERNAL MEDICINE

## 2019-08-02 RX ORDER — ALPRAZOLAM 0.25 MG/1
0.25 TABLET ORAL DAILY
Qty: 30 TABLET | Refills: 2 | Status: SHIPPED | OUTPATIENT
Start: 2019-08-02 | End: 2020-03-13

## 2019-08-02 RX ORDER — SIMVASTATIN 5 MG
5 TABLET ORAL NIGHTLY
Qty: 90 TABLET | Refills: 3 | Status: SHIPPED | OUTPATIENT
Start: 2019-08-02 | End: 2020-09-16

## 2019-08-02 NOTE — PROGRESS NOTES
Subjective   Natasha Langford is a 63 y.o. female. Patient is here today for follow-up on her hypertension, hyperlipidemia, GERD, vitamin D deficiency and anxiety.  She is generally stable and has no acute complaints and has had no chest pain, shortness of breath, edema or myalgias.  Her anxiety is been controlled on medication  Chief Complaint   Patient presents with   • Hyperlipidemia     FOLLOW UP LABS          Vitals:    08/02/19 0909   BP: 124/74   Pulse: 99   Resp: 16   Temp: 97.9 °F (36.6 °C)   SpO2: 99%     The following portions of the patient's history were reviewed and updated as appropriate: allergies, current medications, past family history, past medical history, past social history, past surgical history and problem list.    Past Medical History:   Diagnosis Date   • Plantar fasciitis       Allergies   Allergen Reactions   • Latex    • Procaine       Social History     Socioeconomic History   • Marital status:      Spouse name: Not on file   • Number of children: Not on file   • Years of education: Not on file   • Highest education level: Not on file   Tobacco Use   • Smoking status: Never Smoker   • Smokeless tobacco: Never Used   Substance and Sexual Activity   • Alcohol use: Yes   • Drug use: Defer   • Sexual activity: Defer        Current Outpatient Medications:   •  ALPRAZolam (XANAX) 0.25 MG tablet, Take 1 tablet by mouth Daily., Disp: 30 tablet, Rfl: 2  •  Cholecalciferol (VITAMIN D) 1000 UNITS tablet, Take  by mouth., Disp: , Rfl:   •  doxycycline (VIBRAMYICN) 100 MG tablet, Take 1 tablet by mouth 2 (Two) Times a Day., Disp: 60 tablet, Rfl: 4  •  irbesartan (AVAPRO) 150 MG tablet, TAKE ONE TABLET BY MOUTH DAILY, Disp: 90 tablet, Rfl: 2  •  simvastatin (ZOCOR) 5 MG tablet, Take 1 tablet by mouth Every Night., Disp: 90 tablet, Rfl: 3     Objective     History of Present Illness     Review of Systems   Constitutional: Negative.    HENT: Negative.    Eyes: Negative.    Respiratory: Negative.     Cardiovascular: Negative.    Gastrointestinal: Negative.    Genitourinary: Negative.    Musculoskeletal: Negative.    Skin: Negative.    Neurological: Negative.    Psychiatric/Behavioral: Negative.        Physical Exam   Constitutional: She is oriented to person, place, and time. She appears well-developed and well-nourished.   Pleasant, cooperative no acute distress, blood pressure 135/80 by me, lower at home   HENT:   Head: Normocephalic and atraumatic.   Eyes: Conjunctivae are normal. Pupils are equal, round, and reactive to light. No scleral icterus.   Neck: Normal range of motion. Neck supple.   Cardiovascular: Normal rate, regular rhythm and normal heart sounds.   Pulmonary/Chest: Effort normal and breath sounds normal. No respiratory distress. She has no wheezes. She has no rales.   Musculoskeletal: Normal range of motion. She exhibits no edema.   Neurological: She is alert and oriented to person, place, and time.   Skin: Skin is warm and dry.   Psychiatric: She has a normal mood and affect. Her behavior is normal.   Nursing note and vitals reviewed.      ASSESSMENT CMP is completely normal and TSH is quite normal.  Vitamin D level was 34.4, normal on supplementation.  Lipid panel is well controlled with total cholesterol 165, HDL 61, LDL 87.  #1-hypertension, adequately controlled on medication  #2-hyperlipidemia, well controlled on medication  #3-vitamin D deficiency, controlled on supplement  #4-anxiety, controlled on medication  #5-GERD, asymptomatic  Patient's Navin was reviewed and is appropriate.     Problem List Items Addressed This Visit        Cardiovascular and Mediastinum    HLD (hyperlipidemia)    Relevant Medications    simvastatin (ZOCOR) 5 MG tablet    BP (high blood pressure) - Primary       Digestive    Acid reflux    Avitaminosis D       Other    Anxiety          PLAN I recommended the hepatitis A and shingles vaccinations.  Patient is due for mammogram and probably should have bone  density testing as well.  I refilled the patient's alprazolam and she will continue other medicines as now.  I would like to recheck her in 6 months with a CBC, CMP, lipid panel, and vitamin D level    There are no Patient Instructions on file for this visit.  Return in about 6 months (around 2/2/2020) for with labs.

## 2019-08-29 RX ORDER — DOXYCYCLINE HYCLATE 100 MG
TABLET ORAL
Qty: 60 TABLET | Refills: 3 | Status: SHIPPED | OUTPATIENT
Start: 2019-08-29 | End: 2020-03-13

## 2020-02-03 DIAGNOSIS — E55.9 AVITAMINOSIS D: ICD-10-CM

## 2020-02-03 DIAGNOSIS — E78.5 HYPERLIPIDEMIA, UNSPECIFIED HYPERLIPIDEMIA TYPE: ICD-10-CM

## 2020-02-08 LAB
25(OH)D3+25(OH)D2 SERPL-MCNC: 33.5 NG/ML (ref 30–100)
ALBUMIN SERPL-MCNC: 4.2 G/DL (ref 3.5–5.2)
ALBUMIN/GLOB SERPL: 2.2 G/DL
ALP SERPL-CCNC: 76 U/L (ref 39–117)
ALT SERPL-CCNC: 20 U/L (ref 1–33)
AST SERPL-CCNC: 25 U/L (ref 1–32)
BASOPHILS # BLD AUTO: 0.09 10*3/MM3 (ref 0–0.2)
BASOPHILS NFR BLD AUTO: 1.8 % (ref 0–1.5)
BILIRUB SERPL-MCNC: 0.4 MG/DL (ref 0.2–1.2)
BUN SERPL-MCNC: 19 MG/DL (ref 8–23)
BUN/CREAT SERPL: 21.8 (ref 7–25)
CALCIUM SERPL-MCNC: 9.3 MG/DL (ref 8.6–10.5)
CHLORIDE SERPL-SCNC: 104 MMOL/L (ref 98–107)
CHOLEST SERPL-MCNC: 152 MG/DL (ref 0–200)
CO2 SERPL-SCNC: 24.6 MMOL/L (ref 22–29)
CREAT SERPL-MCNC: 0.87 MG/DL (ref 0.57–1)
EOSINOPHIL # BLD AUTO: 0.2 10*3/MM3 (ref 0–0.4)
EOSINOPHIL NFR BLD AUTO: 3.9 % (ref 0.3–6.2)
ERYTHROCYTE [DISTWIDTH] IN BLOOD BY AUTOMATED COUNT: 14 % (ref 12.3–15.4)
GLOBULIN SER CALC-MCNC: 1.9 GM/DL
GLUCOSE SERPL-MCNC: 97 MG/DL (ref 65–99)
HCT VFR BLD AUTO: 31.4 % (ref 34–46.6)
HDLC SERPL-MCNC: 67 MG/DL (ref 40–60)
HGB BLD-MCNC: 9.9 G/DL (ref 12–15.9)
IMM GRANULOCYTES # BLD AUTO: 0 10*3/MM3 (ref 0–0.05)
IMM GRANULOCYTES NFR BLD AUTO: 0 % (ref 0–0.5)
LDLC SERPL CALC-MCNC: 69 MG/DL (ref 0–100)
LDLC/HDLC SERPL: 1.03 {RATIO}
LYMPHOCYTES # BLD AUTO: 1.51 10*3/MM3 (ref 0.7–3.1)
LYMPHOCYTES NFR BLD AUTO: 29.6 % (ref 19.6–45.3)
MCH RBC QN AUTO: 24.9 PG (ref 26.6–33)
MCHC RBC AUTO-ENTMCNC: 31.5 G/DL (ref 31.5–35.7)
MCV RBC AUTO: 78.9 FL (ref 79–97)
MONOCYTES # BLD AUTO: 0.52 10*3/MM3 (ref 0.1–0.9)
MONOCYTES NFR BLD AUTO: 10.2 % (ref 5–12)
NEUTROPHILS # BLD AUTO: 2.78 10*3/MM3 (ref 1.7–7)
NEUTROPHILS NFR BLD AUTO: 54.5 % (ref 42.7–76)
NRBC BLD AUTO-RTO: 0 /100 WBC (ref 0–0.2)
PLATELET # BLD AUTO: 381 10*3/MM3 (ref 140–450)
POTASSIUM SERPL-SCNC: 4.3 MMOL/L (ref 3.5–5.2)
PROT SERPL-MCNC: 6.1 G/DL (ref 6–8.5)
RBC # BLD AUTO: 3.98 10*6/MM3 (ref 3.77–5.28)
SODIUM SERPL-SCNC: 140 MMOL/L (ref 136–145)
TRIGL SERPL-MCNC: 81 MG/DL (ref 0–150)
VLDLC SERPL CALC-MCNC: 16.2 MG/DL (ref 5–40)
WBC # BLD AUTO: 5.1 10*3/MM3 (ref 3.4–10.8)

## 2020-02-11 ENCOUNTER — OFFICE VISIT (OUTPATIENT)
Dept: FAMILY MEDICINE CLINIC | Facility: CLINIC | Age: 65
End: 2020-02-11

## 2020-02-11 VITALS
TEMPERATURE: 97.8 F | WEIGHT: 133.8 LBS | SYSTOLIC BLOOD PRESSURE: 130 MMHG | RESPIRATION RATE: 16 BRPM | DIASTOLIC BLOOD PRESSURE: 78 MMHG | HEIGHT: 64 IN | OXYGEN SATURATION: 98 % | HEART RATE: 97 BPM | BODY MASS INDEX: 22.84 KG/M2

## 2020-02-11 DIAGNOSIS — E55.9 AVITAMINOSIS D: ICD-10-CM

## 2020-02-11 DIAGNOSIS — D64.9 ANEMIA, UNSPECIFIED TYPE: Primary | ICD-10-CM

## 2020-02-11 DIAGNOSIS — I10 ESSENTIAL HYPERTENSION: ICD-10-CM

## 2020-02-11 DIAGNOSIS — E78.5 HYPERLIPIDEMIA, UNSPECIFIED HYPERLIPIDEMIA TYPE: ICD-10-CM

## 2020-02-11 DIAGNOSIS — F41.9 ANXIETY: ICD-10-CM

## 2020-02-11 LAB
FOLATE SERPL-MCNC: 6.63 NG/ML (ref 4.78–24.2)
IRON SATN MFR SERPL: 8 % (ref 20–50)
IRON SERPL-MCNC: 35 MCG/DL (ref 37–145)
TIBC SERPL-MCNC: 459 MCG/DL
UIBC SERPL-MCNC: 424 MCG/DL (ref 112–346)
VIT B12 SERPL-MCNC: 620 PG/ML (ref 211–946)
WRITTEN AUTHORIZATION: NORMAL

## 2020-02-11 PROCEDURE — 99214 OFFICE O/P EST MOD 30 MIN: CPT | Performed by: INTERNAL MEDICINE

## 2020-02-11 RX ORDER — IRBESARTAN 150 MG/1
150 TABLET ORAL DAILY
Qty: 90 TABLET | Refills: 3 | Status: SHIPPED | OUTPATIENT
Start: 2020-02-11 | End: 2021-02-26 | Stop reason: SDUPTHER

## 2020-02-11 NOTE — PROGRESS NOTES
Subjective   Natasha Langford is a 64 y.o. female. Patient is here today for follow-up on her hyperlipidemia, hypertension, vitamin D deficiency and chronic anxiety.  She is generally been stable and feeling okay and has had no abdominal pains or signs of blood loss.  Chief Complaint   Patient presents with   • Hyperlipidemia     AVITAMINOSIS D- FOLLOW UP LABS          Vitals:    02/11/20 0824   BP: 130/78   Pulse: 97   Resp: 16   Temp: 97.8 °F (36.6 °C)   SpO2: 98%     Body mass index is 23.07 kg/m².  The following portions of the patient's history were reviewed and updated as appropriate: allergies, current medications, past family history, past medical history, past social history, past surgical history and problem list.    Past Medical History:   Diagnosis Date   • Plantar fasciitis       Allergies   Allergen Reactions   • Latex    • Procaine       Social History     Socioeconomic History   • Marital status:      Spouse name: Not on file   • Number of children: Not on file   • Years of education: Not on file   • Highest education level: Not on file   Tobacco Use   • Smoking status: Never Smoker   • Smokeless tobacco: Never Used   Substance and Sexual Activity   • Alcohol use: Yes   • Drug use: Defer   • Sexual activity: Defer        Current Outpatient Medications:   •  ALPRAZolam (XANAX) 0.25 MG tablet, Take 1 tablet by mouth Daily., Disp: 30 tablet, Rfl: 2  •  Cholecalciferol (VITAMIN D) 1000 UNITS tablet, Take  by mouth., Disp: , Rfl:   •  doxycycline (VIBRAMYICN) 100 MG tablet, TAKE ONE TABLET BY MOUTH TWICE A DAY, Disp: 60 tablet, Rfl: 3  •  irbesartan (AVAPRO) 150 MG tablet, Take 1 tablet by mouth Daily., Disp: 90 tablet, Rfl: 3  •  simvastatin (ZOCOR) 5 MG tablet, Take 1 tablet by mouth Every Night., Disp: 90 tablet, Rfl: 3     Objective     History of Present Illness     Review of Systems   Constitutional: Negative.    HENT: Negative.    Eyes: Negative.    Respiratory: Negative.    Cardiovascular:  Negative.    Gastrointestinal: Negative.    Genitourinary: Negative.    Skin: Negative.    Neurological: Negative.    Psychiatric/Behavioral: Negative.        Physical Exam   Constitutional: She is oriented to person, place, and time. She appears well-developed and well-nourished.   Pleasant, cooperative no acute distress, 120/80   HENT:   Head: Normocephalic and atraumatic.   Eyes: Pupils are equal, round, and reactive to light. Conjunctivae are normal. No scleral icterus.   Neck: Normal range of motion. Neck supple.   Cardiovascular: Normal rate, regular rhythm and normal heart sounds.   Pulmonary/Chest: Effort normal and breath sounds normal. No respiratory distress. She has no wheezes. She has no rales.   Abdominal: Soft. Bowel sounds are normal.   Musculoskeletal: Normal range of motion. She exhibits no edema.   Neurological: She is alert and oriented to person, place, and time.   Skin: Skin is warm and dry.   Psychiatric: She has a normal mood and affect. Her behavior is normal.   Nursing note and vitals reviewed.      ASSESSMENT CMP is essentially normal.  Lipid panel is well controlled.  Vitamin D level remains normal on supplement.  CBC is markedly different with a hemoglobin low at 9.9, hematocrit low at 31.4 and some microcytic indices  #1-hypertension controlled  #2-hyperlipidemia controlled  #3-vitamin D deficiency, corrected on supplement  #4-microcytic anemia  #5-anxiety, stable on medication     Problem List Items Addressed This Visit        Cardiovascular and Mediastinum    HLD (hyperlipidemia)    BP (high blood pressure)    Relevant Medications    irbesartan (AVAPRO) 150 MG tablet       Digestive    Avitaminosis D       Hematopoietic and Hemostatic    Anemia - Primary    Relevant Orders    Iron Profile    Vitamin B12    Folate       Other    Anxiety          PLAN I have ordered serum iron and TIBC, vitamin B12 and folic acid levels to be drawn and want the patient to do a fecal occult blood test at  home and return it.  I would like to recheck her in about 2 weeks with a repeat CBC when she comes in.    There are no Patient Instructions on file for this visit.  Return in about 2 weeks (around 2/25/2020).

## 2020-03-10 ENCOUNTER — OFFICE VISIT (OUTPATIENT)
Dept: FAMILY MEDICINE CLINIC | Facility: CLINIC | Age: 65
End: 2020-03-10

## 2020-03-10 ENCOUNTER — RESULTS ENCOUNTER (OUTPATIENT)
Dept: FAMILY MEDICINE CLINIC | Facility: CLINIC | Age: 65
End: 2020-03-10

## 2020-03-10 VITALS
HEART RATE: 69 BPM | WEIGHT: 133 LBS | OXYGEN SATURATION: 98 % | DIASTOLIC BLOOD PRESSURE: 80 MMHG | SYSTOLIC BLOOD PRESSURE: 150 MMHG | HEIGHT: 63 IN | TEMPERATURE: 98.1 F | BODY MASS INDEX: 23.57 KG/M2 | RESPIRATION RATE: 17 BRPM

## 2020-03-10 DIAGNOSIS — D64.9 ANEMIA, UNSPECIFIED TYPE: Primary | ICD-10-CM

## 2020-03-10 DIAGNOSIS — D64.9 ANEMIA, UNSPECIFIED TYPE: ICD-10-CM

## 2020-03-10 PROCEDURE — 99213 OFFICE O/P EST LOW 20 MIN: CPT | Performed by: INTERNAL MEDICINE

## 2020-03-10 NOTE — PROGRESS NOTES
Subjective   Natasha Langford is a 64 y.o. female. Patient is here today for follow-up on her anemia.  She is feeling okay and has no acute complaints.  She has had no obvious bleeding.  She has just recently started on iron.  She does tell me that she donated blood twice fairly recently before the most recent blood test that showed her anemic.  Chief Complaint   Patient presents with   • Anemia          Vitals:    03/10/20 1032   BP: 150/80   Pulse: 69   Resp: 17   Temp: 98.1 °F (36.7 °C)   SpO2: 98%     Body mass index is 23.56 kg/m².  The following portions of the patient's history were reviewed and updated as appropriate: allergies, current medications, past family history, past medical history, past social history, past surgical history and problem list.    Past Medical History:   Diagnosis Date   • Plantar fasciitis       Allergies   Allergen Reactions   • Latex    • Procaine       Social History     Socioeconomic History   • Marital status:      Spouse name: Not on file   • Number of children: Not on file   • Years of education: Not on file   • Highest education level: Not on file   Tobacco Use   • Smoking status: Never Smoker   • Smokeless tobacco: Never Used   Substance and Sexual Activity   • Alcohol use: Yes   • Drug use: Defer   • Sexual activity: Defer        Current Outpatient Medications:   •  ALPRAZolam (XANAX) 0.25 MG tablet, Take 1 tablet by mouth Daily., Disp: 30 tablet, Rfl: 2  •  Cholecalciferol (VITAMIN D) 1000 UNITS tablet, Take  by mouth., Disp: , Rfl:   •  doxycycline (VIBRAMYICN) 100 MG tablet, TAKE ONE TABLET BY MOUTH TWICE A DAY, Disp: 60 tablet, Rfl: 3  •  irbesartan (AVAPRO) 150 MG tablet, Take 1 tablet by mouth Daily., Disp: 90 tablet, Rfl: 3  •  simvastatin (ZOCOR) 5 MG tablet, Take 1 tablet by mouth Every Night., Disp: 90 tablet, Rfl: 3     Objective     History of Present Illness     Review of Systems   Constitutional: Negative.    HENT: Negative.    Respiratory: Negative.     Cardiovascular: Negative.    Gastrointestinal: Negative.    Genitourinary: Negative.    Musculoskeletal: Negative.    Skin: Negative.    Neurological: Negative.    Psychiatric/Behavioral: Negative.        Physical Exam   Constitutional: She is oriented to person, place, and time. She appears well-developed and well-nourished.   Pleasant, cooperative in no distress   HENT:   Head: Normocephalic and atraumatic.   Eyes: Conjunctivae are normal. No scleral icterus.   Neck: Normal range of motion. Neck supple.   Cardiovascular: Normal rate, regular rhythm and normal heart sounds.   Pulmonary/Chest: Effort normal and breath sounds normal. No respiratory distress. She has no wheezes. She has no rales.   Musculoskeletal: Normal range of motion. She exhibits no edema.   Neurological: She is alert and oriented to person, place, and time.   Skin: Skin is warm and dry. No pallor.   Psychiatric: She has a normal mood and affect. Her behavior is normal.   Nursing note and vitals reviewed.      ASSESSMENT serum iron from the last visit was low at 35 with a saturation of only 8% and patient had normal vitamin B12 and folic acid levels.  CBC today is improved with hemoglobin increased to 10.5 and hematocrit increased to 32.9 with microcytic indices.  #1-iron deficiency anemia, improved     Problem List Items Addressed This Visit        Hematopoietic and Hemostatic    Anemia - Primary    Relevant Orders    Cologuard - Stool, Per Rectum          PLAN I have ordered a Cologuard test on the patient and would like to try and take the iron tablets 2-3 times daily and to recheck her in 6 weeks with a CBC and serum iron and TIBC    There are no Patient Instructions on file for this visit.  Return in about 6 weeks (around 4/21/2020) for with labs.

## 2020-03-13 RX ORDER — DOXYCYCLINE HYCLATE 100 MG
TABLET ORAL
Qty: 60 TABLET | Refills: 2 | Status: SHIPPED | OUTPATIENT
Start: 2020-03-13 | End: 2020-11-20

## 2020-03-13 RX ORDER — ALPRAZOLAM 0.25 MG/1
TABLET ORAL
Qty: 30 TABLET | Refills: 3 | Status: SHIPPED | OUTPATIENT
Start: 2020-03-13 | End: 2020-11-20

## 2020-06-10 DIAGNOSIS — D64.9 ANEMIA, UNSPECIFIED TYPE: ICD-10-CM

## 2020-06-10 DIAGNOSIS — I10 ESSENTIAL HYPERTENSION: Primary | ICD-10-CM

## 2020-08-07 LAB
BASOPHILS # BLD AUTO: 0.08 10*3/MM3 (ref 0–0.2)
BASOPHILS NFR BLD AUTO: 1.6 % (ref 0–1.5)
EOSINOPHIL # BLD AUTO: 0.12 10*3/MM3 (ref 0–0.4)
EOSINOPHIL NFR BLD AUTO: 2.3 % (ref 0.3–6.2)
ERYTHROCYTE [DISTWIDTH] IN BLOOD BY AUTOMATED COUNT: 17.2 % (ref 12.3–15.4)
HCT VFR BLD AUTO: 39.2 % (ref 34–46.6)
HGB BLD-MCNC: 12.3 G/DL (ref 12–15.9)
IMM GRANULOCYTES # BLD AUTO: 0.01 10*3/MM3 (ref 0–0.05)
IMM GRANULOCYTES NFR BLD AUTO: 0.2 % (ref 0–0.5)
IRON SATN MFR SERPL: 16 % (ref 20–50)
IRON SERPL-MCNC: 64 MCG/DL (ref 37–145)
LYMPHOCYTES # BLD AUTO: 1.69 10*3/MM3 (ref 0.7–3.1)
LYMPHOCYTES NFR BLD AUTO: 33 % (ref 19.6–45.3)
MCH RBC QN AUTO: 25.9 PG (ref 26.6–33)
MCHC RBC AUTO-ENTMCNC: 31.4 G/DL (ref 31.5–35.7)
MCV RBC AUTO: 82.5 FL (ref 79–97)
MONOCYTES # BLD AUTO: 0.45 10*3/MM3 (ref 0.1–0.9)
MONOCYTES NFR BLD AUTO: 8.8 % (ref 5–12)
NEUTROPHILS # BLD AUTO: 2.77 10*3/MM3 (ref 1.7–7)
NEUTROPHILS NFR BLD AUTO: 54.1 % (ref 42.7–76)
NRBC BLD AUTO-RTO: 0 /100 WBC (ref 0–0.2)
PLATELET # BLD AUTO: 311 10*3/MM3 (ref 140–450)
RBC # BLD AUTO: 4.75 10*6/MM3 (ref 3.77–5.28)
TIBC SERPL-MCNC: 389 MCG/DL
UIBC SERPL-MCNC: 325 MCG/DL (ref 112–346)
WBC # BLD AUTO: 5.12 10*3/MM3 (ref 3.4–10.8)

## 2020-08-14 ENCOUNTER — OFFICE VISIT (OUTPATIENT)
Dept: FAMILY MEDICINE CLINIC | Facility: CLINIC | Age: 65
End: 2020-08-14

## 2020-08-14 VITALS
DIASTOLIC BLOOD PRESSURE: 80 MMHG | RESPIRATION RATE: 16 BRPM | OXYGEN SATURATION: 99 % | HEIGHT: 63 IN | TEMPERATURE: 98.4 F | HEART RATE: 108 BPM | WEIGHT: 138.6 LBS | SYSTOLIC BLOOD PRESSURE: 130 MMHG | BODY MASS INDEX: 24.56 KG/M2

## 2020-08-14 DIAGNOSIS — I10 ESSENTIAL HYPERTENSION: ICD-10-CM

## 2020-08-14 DIAGNOSIS — D64.9 ANEMIA, UNSPECIFIED TYPE: Primary | ICD-10-CM

## 2020-08-14 PROCEDURE — 90471 IMMUNIZATION ADMIN: CPT | Performed by: INTERNAL MEDICINE

## 2020-08-14 PROCEDURE — 90732 PPSV23 VACC 2 YRS+ SUBQ/IM: CPT | Performed by: INTERNAL MEDICINE

## 2020-08-14 PROCEDURE — 99214 OFFICE O/P EST MOD 30 MIN: CPT | Performed by: INTERNAL MEDICINE

## 2020-08-14 NOTE — PROGRESS NOTES
Subjective   Natasha Langford is a 64 y.o. female. Patient is here today for follow-up on her iron deficiency anemia.  She remembers that she had donated blood shortly before the last CBC.  She had trouble taking 3 iron pills a day with severe diarrhea so is only had a little bit on a sporadic basis.  She is feeling generally fine and has no acute complaints  Chief Complaint   Patient presents with   • Hyperlipidemia     FOLLOW UP LABS          Vitals:    08/14/20 1551   BP: 130/80   Pulse: 108   Resp: 16   Temp: 98.4 °F (36.9 °C)   SpO2: 99%     Body mass index is 24.56 kg/m².  The following portions of the patient's history were reviewed and updated as appropriate: allergies, current medications, past family history, past medical history, past social history, past surgical history and problem list.    Past Medical History:   Diagnosis Date   • Plantar fasciitis       Allergies   Allergen Reactions   • Latex    • Procaine       Social History     Socioeconomic History   • Marital status:      Spouse name: Not on file   • Number of children: Not on file   • Years of education: Not on file   • Highest education level: Not on file   Tobacco Use   • Smoking status: Never Smoker   • Smokeless tobacco: Never Used   Substance and Sexual Activity   • Alcohol use: Yes   • Drug use: Defer   • Sexual activity: Defer        Current Outpatient Medications:   •  ALPRAZolam (XANAX) 0.25 MG tablet, TAKE ONE TABLET BY MOUTH DAILY, Disp: 30 tablet, Rfl: 3  •  Cholecalciferol (VITAMIN D) 1000 UNITS tablet, Take  by mouth., Disp: , Rfl:   •  doxycycline (VIBRAMYICN) 100 MG tablet, TAKE ONE TABLET BY MOUTH TWICE A DAY, Disp: 60 tablet, Rfl: 2  •  irbesartan (AVAPRO) 150 MG tablet, Take 1 tablet by mouth Daily., Disp: 90 tablet, Rfl: 3  •  simvastatin (ZOCOR) 5 MG tablet, Take 1 tablet by mouth Every Night., Disp: 90 tablet, Rfl: 3     Objective     History of Present Illness     Review of Systems   Constitutional: Negative.    HENT:  Negative.    Respiratory: Negative.    Cardiovascular: Negative.    Gastrointestinal: Negative.    Genitourinary: Negative.    Musculoskeletal: Negative.    Skin: Negative.    Neurological: Negative.    Psychiatric/Behavioral: Negative.        Physical Exam   Constitutional: She is oriented to person, place, and time. She appears well-developed and well-nourished.   Pleasant, cooperative no acute distress   HENT:   Head: Normocephalic and atraumatic.   Eyes: Conjunctivae are normal. No scleral icterus.   Neck: Normal range of motion. Neck supple.   Cardiovascular: Normal rate, regular rhythm and normal heart sounds.   Pulmonary/Chest: Effort normal and breath sounds normal. No respiratory distress. She has no wheezes. She has no rales.   Musculoskeletal: Normal range of motion. She exhibits no edema.   Neurological: She is alert and oriented to person, place, and time.   Skin: Skin is warm and dry.   Psychiatric: She has a normal mood and affect. Her behavior is normal.   Nursing note and vitals reviewed.      ASSESSMENT Cologuard test is still pending.  The patient's CBC has improved significantly with a normal RBC count, hemoglobin and hematocrit and indices that are improved.  Serum iron and TIBC were normal and iron saturation is also improved significantly to 16%  #1-iron deficiency anemia, corrected  #2-hypertension controlled     Problem List Items Addressed This Visit        Cardiovascular and Mediastinum    BP (high blood pressure)       Hematopoietic and Hemostatic    Anemia - Primary          PLAN the patient received a Pneumovax 23 immunization today.  She will try and take 1 iron pill a day as she can tolerate.  I would like to recheck her in about 6 months with a CBC, CMP, lipid panel, serum iron and TIBC and TSH    There are no Patient Instructions on file for this visit.  Return in about 6 months (around 2/14/2021) for with labs.

## 2020-09-16 RX ORDER — SIMVASTATIN 5 MG
TABLET ORAL
Qty: 90 TABLET | Refills: 3 | Status: SHIPPED | OUTPATIENT
Start: 2020-09-16 | End: 2022-07-07 | Stop reason: SDUPTHER

## 2020-11-20 RX ORDER — ALPRAZOLAM 0.25 MG/1
TABLET ORAL
Qty: 30 TABLET | Refills: 3 | Status: SHIPPED | OUTPATIENT
Start: 2020-11-20 | End: 2022-07-07 | Stop reason: SDUPTHER

## 2020-11-20 RX ORDER — DOXYCYCLINE HYCLATE 100 MG
TABLET ORAL
Qty: 60 TABLET | Refills: 5 | Status: SHIPPED | OUTPATIENT
Start: 2020-11-20 | End: 2022-07-07

## 2021-02-08 DIAGNOSIS — E78.5 HYPERLIPIDEMIA, UNSPECIFIED HYPERLIPIDEMIA TYPE: ICD-10-CM

## 2021-02-08 DIAGNOSIS — D64.9 ANEMIA, UNSPECIFIED TYPE: ICD-10-CM

## 2021-02-26 ENCOUNTER — OFFICE VISIT (OUTPATIENT)
Dept: FAMILY MEDICINE CLINIC | Facility: CLINIC | Age: 66
End: 2021-02-26

## 2021-02-26 VITALS
RESPIRATION RATE: 18 BRPM | BODY MASS INDEX: 23.78 KG/M2 | SYSTOLIC BLOOD PRESSURE: 144 MMHG | WEIGHT: 134.2 LBS | HEART RATE: 111 BPM | DIASTOLIC BLOOD PRESSURE: 80 MMHG | OXYGEN SATURATION: 99 % | HEIGHT: 63 IN | TEMPERATURE: 96.4 F

## 2021-02-26 DIAGNOSIS — I10 ESSENTIAL HYPERTENSION: Primary | ICD-10-CM

## 2021-02-26 DIAGNOSIS — F41.9 ANXIETY: ICD-10-CM

## 2021-02-26 DIAGNOSIS — E55.9 AVITAMINOSIS D: ICD-10-CM

## 2021-02-26 DIAGNOSIS — D64.9 ANEMIA, UNSPECIFIED TYPE: ICD-10-CM

## 2021-02-26 DIAGNOSIS — E78.5 HYPERLIPIDEMIA, UNSPECIFIED HYPERLIPIDEMIA TYPE: ICD-10-CM

## 2021-02-26 DIAGNOSIS — I10 WHITE COAT SYNDROME WITH DIAGNOSIS OF HYPERTENSION: ICD-10-CM

## 2021-02-26 PROCEDURE — 99214 OFFICE O/P EST MOD 30 MIN: CPT | Performed by: INTERNAL MEDICINE

## 2021-02-26 RX ORDER — IRBESARTAN 150 MG/1
150 TABLET ORAL DAILY
Qty: 90 TABLET | Refills: 3 | Status: SHIPPED | OUTPATIENT
Start: 2021-02-26 | End: 2022-06-28

## 2021-02-26 NOTE — PROGRESS NOTES
Subjective   Natasha Lnagford is a 65 y.o. female. Patient is here today for follow-up on her hypertension, hyperlipidemia, vitamin D deficiency, anemia and anxiety.  She is generally doing well and has no acute complaints.  Home blood pressure readings generally are around 120 systolic and 80 diastolic  Chief Complaint   Patient presents with   • Hyperlipidemia     ANEMIA- FOLLOW UP LABS          Vitals:    02/26/21 1332   BP: 144/80   Pulse: 111   Resp: 18   Temp: 96.4 °F (35.8 °C)   SpO2: 99%     Body mass index is 23.78 kg/m².  The following portions of the patient's history were reviewed and updated as appropriate: allergies, current medications, past family history, past medical history, past social history, past surgical history and problem list.    Past Medical History:   Diagnosis Date   • Plantar fasciitis       Allergies   Allergen Reactions   • Latex    • Procaine       Social History     Socioeconomic History   • Marital status:      Spouse name: Not on file   • Number of children: Not on file   • Years of education: Not on file   • Highest education level: Not on file   Tobacco Use   • Smoking status: Never Smoker   • Smokeless tobacco: Never Used   Substance and Sexual Activity   • Alcohol use: Yes   • Drug use: Defer   • Sexual activity: Defer        Current Outpatient Medications:   •  ALPRAZolam (XANAX) 0.25 MG tablet, TAKE ONE TABLET BY MOUTH DAILY, Disp: 30 tablet, Rfl: 3  •  Cholecalciferol (VITAMIN D) 1000 UNITS tablet, Take  by mouth., Disp: , Rfl:   •  doxycycline (VIBRAMYICN) 100 MG tablet, TAKE ONE TABLET BY MOUTH TWICE A DAY, Disp: 60 tablet, Rfl: 5  •  irbesartan (AVAPRO) 150 MG tablet, Take 1 tablet by mouth Daily., Disp: 90 tablet, Rfl: 3  •  simvastatin (ZOCOR) 5 MG tablet, TAKE ONE TABLET BY MOUTH ONCE NIGHTLY, Disp: 90 tablet, Rfl: 3     Objective     History of Present Illness     Review of Systems   Constitutional: Negative.    HENT: Negative.    Eyes: Negative.    Respiratory:  Negative.    Cardiovascular: Negative.    Gastrointestinal: Negative.    Genitourinary: Negative.    Musculoskeletal: Negative.    Skin: Negative.    Neurological: Negative.    Hematological: Negative.    Psychiatric/Behavioral: Negative.        Physical Exam  Vitals signs and nursing note reviewed.   Constitutional:       General: She is not in acute distress.     Appearance: Normal appearance. She is normal weight. She is not ill-appearing.   HENT:      Head: Normocephalic and atraumatic.   Eyes:      General: No scleral icterus.     Conjunctiva/sclera: Conjunctivae normal.   Neck:      Musculoskeletal: Normal range of motion and neck supple.   Cardiovascular:      Rate and Rhythm: Normal rate and regular rhythm.      Heart sounds: Normal heart sounds.   Pulmonary:      Effort: Pulmonary effort is normal. No respiratory distress.      Breath sounds: Normal breath sounds. No wheezing or rales.   Musculoskeletal: Normal range of motion.   Skin:     General: Skin is warm and dry.   Neurological:      General: No focal deficit present.      Mental Status: She is alert and oriented to person, place, and time.   Psychiatric:         Mood and Affect: Mood normal.         Behavior: Behavior normal.         ASSESSMENT CBC is completely normal and serum iron and TIBC are also completely normal.  CMP and TSH are quite normal and lipid panel is controlled with total cholesterol 165, HDL 60 and LDL 85  #1-hypertension controlled on medication based on home readings  #2-hyperlipidemia, well controlled on medication  #3-iron deficiency anemia, corrected  #4-vitamin D deficiency, asymptomatic  #5-anxiety, stable on medications     Problems Addressed this Visit        Cardiac and Vasculature    HLD (hyperlipidemia)    BP (high blood pressure) - Primary    Relevant Medications    irbesartan (AVAPRO) 150 MG tablet       Endocrine and Metabolic    Avitaminosis D       Hematology and Neoplasia    Anemia       Mental Health    Anxiety       Diagnoses       Codes Comments    Essential hypertension    -  Primary ICD-10-CM: I10  ICD-9-CM: 401.9     Hyperlipidemia, unspecified hyperlipidemia type     ICD-10-CM: E78.5  ICD-9-CM: 272.4     Avitaminosis D     ICD-10-CM: E55.9  ICD-9-CM: 268.9     Anemia, unspecified type     ICD-10-CM: D64.9  ICD-9-CM: 285.9     Anxiety     ICD-10-CM: F41.9  ICD-9-CM: 300.00           PLAN patient will continue current medicines as now and I will recheck her in 6 months with a CBC, CMP, lipid panel and vitamin D level    There are no Patient Instructions on file for this visit.  Return in about 6 months (around 8/26/2021) for with labs.

## 2021-08-26 DIAGNOSIS — E78.5 HYPERLIPIDEMIA, UNSPECIFIED HYPERLIPIDEMIA TYPE: ICD-10-CM

## 2021-08-26 DIAGNOSIS — E55.9 AVITAMINOSIS D: ICD-10-CM

## 2022-06-24 RX ORDER — IRBESARTAN 150 MG/1
TABLET ORAL
Qty: 90 TABLET | Refills: 3 | OUTPATIENT
Start: 2022-06-24

## 2022-06-28 RX ORDER — IRBESARTAN 150 MG/1
TABLET ORAL
Qty: 30 TABLET | Refills: 0 | Status: SHIPPED | OUTPATIENT
Start: 2022-06-28 | End: 2022-07-07 | Stop reason: SDUPTHER

## 2022-07-07 ENCOUNTER — OFFICE VISIT (OUTPATIENT)
Dept: FAMILY MEDICINE CLINIC | Facility: CLINIC | Age: 67
End: 2022-07-07

## 2022-07-07 VITALS
RESPIRATION RATE: 18 BRPM | SYSTOLIC BLOOD PRESSURE: 130 MMHG | BODY MASS INDEX: 22.5 KG/M2 | TEMPERATURE: 96.9 F | HEART RATE: 84 BPM | WEIGHT: 127 LBS | DIASTOLIC BLOOD PRESSURE: 80 MMHG | OXYGEN SATURATION: 100 % | HEIGHT: 63 IN

## 2022-07-07 DIAGNOSIS — Z12.11 ENCOUNTER FOR SCREENING FOR MALIGNANT NEOPLASM OF COLON: ICD-10-CM

## 2022-07-07 DIAGNOSIS — I10 PRIMARY HYPERTENSION: Primary | ICD-10-CM

## 2022-07-07 DIAGNOSIS — D50.9 IRON DEFICIENCY ANEMIA, UNSPECIFIED IRON DEFICIENCY ANEMIA TYPE: ICD-10-CM

## 2022-07-07 DIAGNOSIS — F41.9 ANXIETY: ICD-10-CM

## 2022-07-07 DIAGNOSIS — E78.5 HYPERLIPIDEMIA, UNSPECIFIED HYPERLIPIDEMIA TYPE: ICD-10-CM

## 2022-07-07 DIAGNOSIS — E55.9 AVITAMINOSIS D: ICD-10-CM

## 2022-07-07 PROBLEM — D64.9 ANEMIA: Status: RESOLVED | Noted: 2020-02-11 | Resolved: 2022-07-07

## 2022-07-07 PROCEDURE — 99214 OFFICE O/P EST MOD 30 MIN: CPT | Performed by: INTERNAL MEDICINE

## 2022-07-07 RX ORDER — SIMVASTATIN 5 MG
5 TABLET ORAL NIGHTLY
Qty: 90 TABLET | Refills: 3 | Status: SHIPPED | OUTPATIENT
Start: 2022-07-07

## 2022-07-07 RX ORDER — IRBESARTAN 150 MG/1
150 TABLET ORAL DAILY
Qty: 90 TABLET | Refills: 3 | Status: SHIPPED | OUTPATIENT
Start: 2022-07-07

## 2022-07-07 RX ORDER — ALPRAZOLAM 0.25 MG/1
0.25 TABLET ORAL DAILY
Qty: 30 TABLET | Refills: 3 | Status: SHIPPED | OUTPATIENT
Start: 2022-07-07

## 2022-07-07 NOTE — PROGRESS NOTES
Subjective   Natasha Langford is a 66 y.o. female. Patient is here today for follow-up on her hypertension, hyperlipidemia, vitamin D deficiency and anxiety.  She is generally been stable doing well.  She has not needed much Xanax for her anxiety.  She has not been taking vitamin D regularly nor has she been getting a simvastatin regularly.  She has been diligent on her blood pressure medication.  She also did not complete Cologuard test.    Chief Complaint   Patient presents with   • Hyperlipidemia     LAB FU   • Med Refill     She needs all medications refilled          Vitals:    07/07/22 0859   BP: 161/68   Pulse: 84   Resp: 18   Temp: 96.9 °F (36.1 °C)   SpO2: 100%     Body mass index is 22.5 kg/m².  The following portions of the patient's history were reviewed and updated as appropriate: allergies, current medications, past family history, past medical history, past social history, past surgical history and problem list.    Past Medical History:   Diagnosis Date   • Plantar fasciitis       Allergies   Allergen Reactions   • Latex    • Procaine       Social History     Socioeconomic History   • Marital status:    Tobacco Use   • Smoking status: Never Smoker   • Smokeless tobacco: Never Used   Substance and Sexual Activity   • Alcohol use: Yes   • Drug use: Defer   • Sexual activity: Defer        Current Outpatient Medications:   •  ALPRAZolam (XANAX) 0.25 MG tablet, Take 1 tablet by mouth Daily., Disp: 30 tablet, Rfl: 3  •  Cholecalciferol (VITAMIN D) 1000 UNITS tablet, Take  by mouth., Disp: , Rfl:   •  irbesartan (AVAPRO) 150 MG tablet, Take 1 tablet by mouth Daily., Disp: 90 tablet, Rfl: 3  •  simvastatin (ZOCOR) 5 MG tablet, Take 1 tablet by mouth Every Night., Disp: 90 tablet, Rfl: 3     Objective     History of Present Illness     Review of Systems   All other systems reviewed and are negative.      Physical Exam  Vitals and nursing note reviewed.   Constitutional:       General: She is not in acute  distress.     Appearance: Normal appearance. She is not ill-appearing.   HENT:      Head: Normocephalic and atraumatic.   Cardiovascular:      Rate and Rhythm: Normal rate and regular rhythm.      Heart sounds: Normal heart sounds.   Pulmonary:      Effort: Pulmonary effort is normal. No respiratory distress.      Breath sounds: Normal breath sounds. No wheezing or rales.   Skin:     General: Skin is warm and dry.   Neurological:      General: No focal deficit present.      Mental Status: She is alert and oriented to person, place, and time.   Psychiatric:         Mood and Affect: Mood normal.         Behavior: Behavior normal.         ASSESSMENT CBC is completely normal including indices.  CMP was completely normal.  Lipid panel is higher with total cholesterol 219, HDL 60, .  Vitamin D was low at 23.3, off supplement  #1-hypertension, controlled on medication  #2-hyperlipidemia, not as well-controlled due to sporadic medication use  #3-White coat syndrome with controlled blood pressure  #4-vitamin D deficiency, needs to take supplement regularly  #5-anxiety improved and controlled by Xanax       Problems Addressed this Visit        Cardiac and Vasculature    HLD (hyperlipidemia)    Relevant Medications    simvastatin (ZOCOR) 5 MG tablet    BP (high blood pressure) - Primary    Relevant Medications    irbesartan (AVAPRO) 150 MG tablet       Endocrine and Metabolic    Avitaminosis D       Mental Health    Anxiety      Other Visit Diagnoses     Encounter for screening for malignant neoplasm of colon        Relevant Orders    Cologuard - Stool, Per Rectum      Diagnoses       Codes Comments    Primary hypertension    -  Primary ICD-10-CM: I10  ICD-9-CM: 401.9     Hyperlipidemia, unspecified hyperlipidemia type     ICD-10-CM: E78.5  ICD-9-CM: 272.4     Avitaminosis D     ICD-10-CM: E55.9  ICD-9-CM: 268.9     Anxiety     ICD-10-CM: F41.9  ICD-9-CM: 300.00     Encounter for screening for malignant neoplasm of  colon     ICD-10-CM: Z12.11  ICD-9-CM: V76.51           PLAN the patient will make an appointment with gynecology and will get mammograms and bone density test.  I recommended she consider the shingles immunizations, flu shot in October and a fourth COVID-19 vaccination when appropriate.  I did order again the Cologuard test and the patient promises to do it.  She will continue medicines as now.  I want her to take either vitamin D 1000 units daily or the calcium vitamin D supplement that is over-the-counter.  I plan on rechecking her in 6 months with laboratory studies      There are no Patient Instructions on file for this visit.  Return in about 6 months (around 1/7/2023) for with labs.

## 2023-01-26 ENCOUNTER — OFFICE VISIT (OUTPATIENT)
Dept: FAMILY MEDICINE CLINIC | Facility: CLINIC | Age: 68
End: 2023-01-26
Payer: MEDICARE

## 2023-01-26 VITALS
HEIGHT: 63 IN | HEART RATE: 58 BPM | BODY MASS INDEX: 23.21 KG/M2 | DIASTOLIC BLOOD PRESSURE: 94 MMHG | WEIGHT: 131 LBS | OXYGEN SATURATION: 96 % | TEMPERATURE: 95.8 F | SYSTOLIC BLOOD PRESSURE: 150 MMHG

## 2023-01-26 DIAGNOSIS — E55.9 AVITAMINOSIS D: ICD-10-CM

## 2023-01-26 DIAGNOSIS — E78.5 HYPERLIPIDEMIA, UNSPECIFIED HYPERLIPIDEMIA TYPE: ICD-10-CM

## 2023-01-26 DIAGNOSIS — Z78.0 POST-MENOPAUSAL: Primary | ICD-10-CM

## 2023-01-26 DIAGNOSIS — I10 WHITE COAT SYNDROME WITH DIAGNOSIS OF HYPERTENSION: ICD-10-CM

## 2023-01-26 DIAGNOSIS — I10 PRIMARY HYPERTENSION: ICD-10-CM

## 2023-01-26 DIAGNOSIS — F41.9 ANXIETY: ICD-10-CM

## 2023-01-26 PROCEDURE — 99214 OFFICE O/P EST MOD 30 MIN: CPT | Performed by: INTERNAL MEDICINE

## 2023-01-26 NOTE — PROGRESS NOTES
Barbara Langford is a 67 y.o. female. Patient is here today for follow-up on her hypertension with whitecoat syndrome, hyperlipidemia, vitamin D deficiency and anxiety.  She is generally been stable.  She has no acute complaints.     No chief complaint on file.         Vitals:    01/26/23 0901   BP: 150/94   Pulse: 58   Temp: 95.8 °F (35.4 °C)   SpO2: 96%     Body mass index is 23.21 kg/m².  The following portions of the patient's history were reviewed and updated as appropriate: allergies, current medications, past family history, past medical history, past social history, past surgical history and problem list.    Past Medical History:   Diagnosis Date   • Plantar fasciitis       Allergies   Allergen Reactions   • Latex    • Procaine       Social History     Socioeconomic History   • Marital status:    Tobacco Use   • Smoking status: Never   • Smokeless tobacco: Never   Substance and Sexual Activity   • Alcohol use: Yes   • Drug use: Defer   • Sexual activity: Defer        Current Outpatient Medications:   •  Cholecalciferol (VITAMIN D) 1000 UNITS tablet, Take  by mouth., Disp: , Rfl:   •  irbesartan (AVAPRO) 150 MG tablet, Take 1 tablet by mouth Daily., Disp: 90 tablet, Rfl: 3  •  simvastatin (ZOCOR) 5 MG tablet, Take 1 tablet by mouth Every Night., Disp: 90 tablet, Rfl: 3  •  ALPRAZolam (XANAX) 0.25 MG tablet, Take 1 tablet by mouth Daily., Disp: 30 tablet, Rfl: 3     Objective     History of Present Illness     Review of Systems    Physical Exam  Vitals and nursing note reviewed.   Constitutional:       General: She is not in acute distress.     Appearance: Normal appearance. She is normal weight. She is not ill-appearing.   HENT:      Head: Normocephalic and atraumatic.   Cardiovascular:      Rate and Rhythm: Normal rate and regular rhythm.      Heart sounds: Normal heart sounds.   Pulmonary:      Effort: Pulmonary effort is normal. No respiratory distress.      Breath sounds: Normal breath  sounds. No wheezing or rales.   Skin:     General: Skin is warm and dry.   Neurological:      General: No focal deficit present.      Mental Status: She is alert and oriented to person, place, and time.   Psychiatric:         Mood and Affect: Mood normal.         Behavior: Behavior normal.         ASSESSMENT CBC was normal and serum iron and TIBC were normal.  CMP had a creatinine of 1.04 and was otherwise normal and lipid panel is much improved with total cholesterol 163, HDL 60 and LDL 82.  TSH and free T4 were normal.  Vitamin D level continues low at 25.6.  #1-hypertension with whitecoat syndrome, the patient needs to monitor blood pressure at home  #2-vitamin D deficiency  #3-anxiety, stable on medication  #4-hyperlipidemia, controlled on medication       Problems Addressed this Visit        Cardiac and Vasculature    HLD (hyperlipidemia)    BP (high blood pressure)    White coat syndrome with diagnosis of hypertension       Endocrine and Metabolic    Avitaminosis D       Mental Health    Anxiety   Other Visit Diagnoses     Post-menopausal    -  Primary    Relevant Orders    DEXA Bone Density Axial      Diagnoses       Codes Comments    Post-menopausal    -  Primary ICD-10-CM: Z78.0  ICD-9-CM: V49.81     Primary hypertension     ICD-10-CM: I10  ICD-9-CM: 401.9     Hyperlipidemia, unspecified hyperlipidemia type     ICD-10-CM: E78.5  ICD-9-CM: 272.4     White coat syndrome with diagnosis of hypertension     ICD-10-CM: I10  ICD-9-CM: 401.9     Avitaminosis D     ICD-10-CM: E55.9  ICD-9-CM: 268.9     Anxiety     ICD-10-CM: F41.9  ICD-9-CM: 300.00           PLAN I ordered a bone density test for the patient.  She needs to get with gynecology for mammograms and Pap smear.  She will continue current medicines as now.  I did recommend the shingles immunizations and I want her to complete the Cologuard test that she has.  I plan on rechecking her in 6 months with a wellness visit and a CBC, CMP, lipid panel and vitamin  D level    There are no Patient Instructions on file for this visit.  Return in about 6 months (around 7/26/2023) for with labs.

## 2023-08-29 RX ORDER — SIMVASTATIN 5 MG
TABLET ORAL
Qty: 90 TABLET | Refills: 3 | Status: SHIPPED | OUTPATIENT
Start: 2023-08-29

## 2023-09-18 RX ORDER — IRBESARTAN 150 MG/1
TABLET ORAL
Qty: 90 TABLET | Refills: 3 | Status: SHIPPED | OUTPATIENT
Start: 2023-09-18

## 2024-10-08 ENCOUNTER — OFFICE VISIT (OUTPATIENT)
Dept: FAMILY MEDICINE CLINIC | Facility: CLINIC | Age: 69
End: 2024-10-08
Payer: MEDICARE

## 2024-10-08 VITALS
SYSTOLIC BLOOD PRESSURE: 134 MMHG | WEIGHT: 135.9 LBS | RESPIRATION RATE: 16 BRPM | BODY MASS INDEX: 24.08 KG/M2 | TEMPERATURE: 96.2 F | OXYGEN SATURATION: 98 % | HEIGHT: 63 IN | DIASTOLIC BLOOD PRESSURE: 74 MMHG | HEART RATE: 82 BPM

## 2024-10-08 DIAGNOSIS — Z78.0 ENCOUNTER FOR OSTEOPOROSIS SCREENING IN ASYMPTOMATIC POSTMENOPAUSAL PATIENT: ICD-10-CM

## 2024-10-08 DIAGNOSIS — F41.9 ANXIETY: ICD-10-CM

## 2024-10-08 DIAGNOSIS — E55.9 AVITAMINOSIS D: ICD-10-CM

## 2024-10-08 DIAGNOSIS — Z13.820 ENCOUNTER FOR OSTEOPOROSIS SCREENING IN ASYMPTOMATIC POSTMENOPAUSAL PATIENT: ICD-10-CM

## 2024-10-08 DIAGNOSIS — Z12.31 ENCOUNTER FOR SCREENING MAMMOGRAM FOR MALIGNANT NEOPLASM OF BREAST: ICD-10-CM

## 2024-10-08 DIAGNOSIS — I10 PRIMARY HYPERTENSION: Primary | ICD-10-CM

## 2024-10-08 DIAGNOSIS — E78.5 HYPERLIPIDEMIA, UNSPECIFIED HYPERLIPIDEMIA TYPE: ICD-10-CM

## 2024-10-08 DIAGNOSIS — Z78.9 VEGETARIAN: ICD-10-CM

## 2024-10-08 PROCEDURE — 1159F MED LIST DOCD IN RCRD: CPT | Performed by: NURSE PRACTITIONER

## 2024-10-08 PROCEDURE — 1160F RVW MEDS BY RX/DR IN RCRD: CPT | Performed by: NURSE PRACTITIONER

## 2024-10-08 PROCEDURE — 99214 OFFICE O/P EST MOD 30 MIN: CPT | Performed by: NURSE PRACTITIONER

## 2024-10-08 PROCEDURE — 1126F AMNT PAIN NOTED NONE PRSNT: CPT | Performed by: NURSE PRACTITIONER

## 2024-10-08 PROCEDURE — 3075F SYST BP GE 130 - 139MM HG: CPT | Performed by: NURSE PRACTITIONER

## 2024-10-08 PROCEDURE — 3078F DIAST BP <80 MM HG: CPT | Performed by: NURSE PRACTITIONER

## 2024-10-08 RX ORDER — IRBESARTAN 150 MG/1
150 TABLET ORAL DAILY
Qty: 90 TABLET | Refills: 1 | Status: SHIPPED | OUTPATIENT
Start: 2024-10-08

## 2024-10-08 RX ORDER — SIMVASTATIN 5 MG
5 TABLET ORAL NIGHTLY
Qty: 90 TABLET | Refills: 1 | Status: SHIPPED | OUTPATIENT
Start: 2024-10-08

## 2024-10-08 NOTE — PROGRESS NOTES
"Subjective     Natasha Langford is a 68 y.o.. female.     Patient here to become established.    Patient with problem list that includes hypertension and hyperlipidemia. Patient stating she is vegetarian, mostly pesatarian, states she eats iron rich foods and also takes a supplement, drinks some water through out day and drinks 16 oz peppermint mocha coffee once a day.    Hypertension  Associated symptoms include anxiety.   Anxiety   Symptoms include nervous/anxious behavior.  Patient reports no suicidal ideas.       The following portions of the patient's history were reviewed and updated as appropriate: allergies, current medications, past family history, past medical history, past social history, past surgical history and problem list.    Past Medical History:   Diagnosis Date    Plantar fasciitis        Past Surgical History:   Procedure Laterality Date    WISDOM TOOTH EXTRACTION         Review of Systems   Constitutional: Negative.    Respiratory: Negative.     Cardiovascular: Negative.    Psychiatric/Behavioral:  Negative for dysphoric mood, self-injury, sleep disturbance and suicidal ideas. The patient is nervous/anxious.        Allergies   Allergen Reactions    Latex     Procaine        Objective     Vitals:    10/08/24 1421 10/08/24 1448   BP: 132/64 134/74   Pulse: 103 82   Resp: 16    Temp: 96.2 °F (35.7 °C)    TempSrc: Temporal    SpO2: 98%    Weight: 61.6 kg (135 lb 14.4 oz)    Height: 160 cm (63\")      Body mass index is 24.07 kg/m².    Physical Exam  Vitals reviewed.   HENT:      Head: Normocephalic.   Eyes:      Pupils: Pupils are equal, round, and reactive to light.   Neck:      Vascular: No carotid bruit.   Cardiovascular:      Rate and Rhythm: Normal rate and regular rhythm.      Pulses: Normal pulses.   Pulmonary:      Effort: Pulmonary effort is normal.      Breath sounds: Normal breath sounds. No wheezing, rhonchi or rales.   Chest:      Chest wall: No tenderness.   Musculoskeletal:         " General: Normal range of motion.      Right lower leg: No edema.      Left lower leg: No edema.   Skin:     General: Skin is warm and dry.   Neurological:      Mental Status: She is alert and oriented to person, place, and time.   Psychiatric:         Behavior: Behavior normal.           Current Outpatient Medications:     Cholecalciferol (VITAMIN D) 1000 UNITS tablet, Take  by mouth., Disp: , Rfl:     irbesartan (AVAPRO) 150 MG tablet, Take 1 tablet by mouth Daily., Disp: 90 tablet, Rfl: 1    simvastatin (ZOCOR) 5 MG tablet, Take 1 tablet by mouth Every Night., Disp: 90 tablet, Rfl: 1    No results found for this or any previous visit (from the past 2016 hour(s)).      Diagnoses and all orders for this visit:    1. Primary hypertension (Primary)  -     CBC & Differential  -     Comprehensive Metabolic Panel  -     Urinalysis With Microscopic - Urine, Clean Catch  -     irbesartan (AVAPRO) 150 MG tablet; Take 1 tablet by mouth Daily.  Dispense: 90 tablet; Refill: 1    2. Hyperlipidemia, unspecified hyperlipidemia type  -     Lipid Panel With LDL / HDL Ratio  -     simvastatin (ZOCOR) 5 MG tablet; Take 1 tablet by mouth Every Night.  Dispense: 90 tablet; Refill: 1    3. Anxiety  -     Comprehensive Metabolic Panel  -     TSH    4. Avitaminosis D  -     Vitamin D,25-Hydroxy    5. Vegetarian  -     Vitamin B12    6. Encounter for osteoporosis screening in asymptomatic postmenopausal patient  -     DEXA Bone Density Axial    7. Encounter for screening mammogram for malignant neoplasm of breast  -     Mammo Screening Bilateral With CAD; Future        Patient Instructions   Eat a heart healthy diet  Drink plenty of water throughout the day  Stay active 3-5 times a week    Return for fasting labs, Annual physical.

## 2024-10-14 ENCOUNTER — PATIENT MESSAGE (OUTPATIENT)
Dept: FAMILY MEDICINE CLINIC | Facility: CLINIC | Age: 69
End: 2024-10-14
Payer: MEDICARE

## 2024-10-18 DIAGNOSIS — Z12.31 ENCOUNTER FOR SCREENING MAMMOGRAM FOR MALIGNANT NEOPLASM OF BREAST: Primary | ICD-10-CM

## 2025-04-06 DIAGNOSIS — I10 PRIMARY HYPERTENSION: ICD-10-CM

## 2025-04-07 RX ORDER — IRBESARTAN 150 MG/1
150 TABLET ORAL DAILY
Qty: 90 TABLET | Refills: 0 | Status: SHIPPED | OUTPATIENT
Start: 2025-04-07